# Patient Record
Sex: FEMALE | Race: WHITE | Employment: OTHER | URBAN - METROPOLITAN AREA
[De-identification: names, ages, dates, MRNs, and addresses within clinical notes are randomized per-mention and may not be internally consistent; named-entity substitution may affect disease eponyms.]

---

## 2018-10-17 ENCOUNTER — HOSPITAL ENCOUNTER (INPATIENT)
Age: 38
LOS: 3 days | Discharge: ANOTHER HOSPITAL | DRG: 876 | End: 2018-10-21
Attending: PSYCHIATRY & NEUROLOGY | Admitting: PSYCHIATRY & NEUROLOGY
Payer: COMMERCIAL

## 2018-10-17 ENCOUNTER — APPOINTMENT (OUTPATIENT)
Dept: GENERAL RADIOLOGY | Age: 38
DRG: 876 | End: 2018-10-17
Payer: COMMERCIAL

## 2018-10-17 DIAGNOSIS — Z91.199 H/O NONCOMPLIANCE WITH MEDICAL TREATMENT, PRESENTING HAZARDS TO HEALTH: ICD-10-CM

## 2018-10-17 DIAGNOSIS — N18.5 CHRONIC RENAL FAILURE, STAGE 5 (HCC): ICD-10-CM

## 2018-10-17 DIAGNOSIS — F14.10 COCAINE ABUSE (HCC): ICD-10-CM

## 2018-10-17 DIAGNOSIS — F33.2 SEVERE EPISODE OF RECURRENT MAJOR DEPRESSIVE DISORDER, WITHOUT PSYCHOTIC FEATURES (HCC): Primary | ICD-10-CM

## 2018-10-17 DIAGNOSIS — F12.10 MARIJUANA ABUSE: ICD-10-CM

## 2018-10-17 LAB
ACETAMINOPHEN LEVEL: <5 UG/ML (ref 10–30)
ALBUMIN SERPL-MCNC: 4.3 G/DL (ref 3.9–4.9)
ALP BLD-CCNC: 83 U/L (ref 40–130)
ALT SERPL-CCNC: 9 U/L (ref 0–33)
AMPHETAMINE SCREEN, URINE: ABNORMAL
ANION GAP SERPL CALCULATED.3IONS-SCNC: 20 MEQ/L (ref 7–13)
AST SERPL-CCNC: 16 U/L (ref 0–35)
BACTERIA: NORMAL /HPF
BARBITURATE SCREEN URINE: ABNORMAL
BASOPHILS ABSOLUTE: 0.1 K/UL (ref 0–0.2)
BASOPHILS RELATIVE PERCENT: 1.2 %
BENZODIAZEPINE SCREEN, URINE: POSITIVE
BILIRUB SERPL-MCNC: 0.3 MG/DL (ref 0–1.2)
BILIRUBIN URINE: NEGATIVE
BLOOD, URINE: ABNORMAL
BUN BLDV-MCNC: 56 MG/DL (ref 6–20)
CALCIUM SERPL-MCNC: 10 MG/DL (ref 8.6–10.2)
CANNABINOID SCREEN URINE: POSITIVE
CHLORIDE BLD-SCNC: 100 MEQ/L (ref 98–107)
CLARITY: CLEAR
CO2: 20 MEQ/L (ref 22–29)
COCAINE METABOLITE SCREEN URINE: POSITIVE
COLOR: YELLOW
CREAT SERPL-MCNC: 11.15 MG/DL (ref 0.5–0.9)
EKG ATRIAL RATE: 80 BPM
EKG P AXIS: 41 DEGREES
EKG P-R INTERVAL: 124 MS
EKG Q-T INTERVAL: 414 MS
EKG QRS DURATION: 88 MS
EKG QTC CALCULATION (BAZETT): 477 MS
EKG R AXIS: 9 DEGREES
EKG T AXIS: 59 DEGREES
EKG VENTRICULAR RATE: 80 BPM
EOSINOPHILS ABSOLUTE: 0.1 K/UL (ref 0–0.7)
EOSINOPHILS RELATIVE PERCENT: 0.6 %
ETHANOL PERCENT: NORMAL G/DL
ETHANOL: <10 MG/DL (ref 0–0.08)
GFR AFRICAN AMERICAN: 4.6
GFR NON-AFRICAN AMERICAN: 3.8
GLOBULIN: 2.8 G/DL (ref 2.3–3.5)
GLUCOSE BLD-MCNC: 96 MG/DL (ref 74–109)
GLUCOSE URINE: 100 MG/DL
HCT VFR BLD CALC: 39.7 % (ref 37–47)
HEMOGLOBIN: 13.1 G/DL (ref 12–16)
KETONES, URINE: NEGATIVE MG/DL
LEUKOCYTE ESTERASE, URINE: NEGATIVE
LYMPHOCYTES ABSOLUTE: 2.5 K/UL (ref 1–4.8)
LYMPHOCYTES RELATIVE PERCENT: 27.7 %
Lab: ABNORMAL
MCH RBC QN AUTO: 31.1 PG (ref 27–31.3)
MCHC RBC AUTO-ENTMCNC: 33.1 % (ref 33–37)
MCV RBC AUTO: 94.2 FL (ref 82–100)
MONOCYTES ABSOLUTE: 0.5 K/UL (ref 0.2–0.8)
MONOCYTES RELATIVE PERCENT: 5.6 %
NEUTROPHILS ABSOLUTE: 5.9 K/UL (ref 1.4–6.5)
NEUTROPHILS RELATIVE PERCENT: 64.9 %
NITRITE, URINE: NEGATIVE
OPIATE SCREEN URINE: ABNORMAL
PDW BLD-RTO: 18.4 % (ref 11.5–14.5)
PH UA: 8 (ref 5–9)
PHENCYCLIDINE SCREEN URINE: ABNORMAL
PLATELET # BLD: 189 K/UL (ref 130–400)
POTASSIUM SERPL-SCNC: 4.6 MEQ/L (ref 3.5–5.1)
PROTEIN UA: 100 MG/DL
RBC # BLD: 4.21 M/UL (ref 4.2–5.4)
RBC UA: NORMAL /HPF (ref 0–2)
SALICYLATE, SERUM: <0.3 MG/DL (ref 15–30)
SODIUM BLD-SCNC: 140 MEQ/L (ref 132–144)
SPECIFIC GRAVITY UA: 1.01 (ref 1–1.03)
TOTAL CK: 149 U/L (ref 0–170)
TOTAL PROTEIN: 7.1 G/DL (ref 6.4–8.1)
URINE REFLEX TO CULTURE: YES
UROBILINOGEN, URINE: 0.2 E.U./DL
WBC # BLD: 9.1 K/UL (ref 4.8–10.8)
WBC UA: NORMAL /HPF (ref 0–5)

## 2018-10-17 PROCEDURE — 82550 ASSAY OF CK (CPK): CPT

## 2018-10-17 PROCEDURE — 80307 DRUG TEST PRSMV CHEM ANLYZR: CPT

## 2018-10-17 PROCEDURE — G0480 DRUG TEST DEF 1-7 CLASSES: HCPCS

## 2018-10-17 PROCEDURE — 87086 URINE CULTURE/COLONY COUNT: CPT

## 2018-10-17 PROCEDURE — 6360000002 HC RX W HCPCS: Performed by: PHYSICIAN ASSISTANT

## 2018-10-17 PROCEDURE — 71045 X-RAY EXAM CHEST 1 VIEW: CPT

## 2018-10-17 PROCEDURE — 93005 ELECTROCARDIOGRAM TRACING: CPT

## 2018-10-17 PROCEDURE — 6370000000 HC RX 637 (ALT 250 FOR IP): Performed by: PHYSICIAN ASSISTANT

## 2018-10-17 PROCEDURE — 81001 URINALYSIS AUTO W/SCOPE: CPT

## 2018-10-17 PROCEDURE — 36415 COLL VENOUS BLD VENIPUNCTURE: CPT

## 2018-10-17 PROCEDURE — 85025 COMPLETE CBC W/AUTO DIFF WBC: CPT

## 2018-10-17 PROCEDURE — 80053 COMPREHEN METABOLIC PANEL: CPT

## 2018-10-17 PROCEDURE — 99285 EMERGENCY DEPT VISIT HI MDM: CPT

## 2018-10-17 RX ORDER — ONDANSETRON 2 MG/ML
4 INJECTION INTRAMUSCULAR; INTRAVENOUS ONCE
Status: COMPLETED | OUTPATIENT
Start: 2018-10-17 | End: 2018-10-17

## 2018-10-17 RX ORDER — LORAZEPAM 2 MG/ML
1 INJECTION INTRAMUSCULAR ONCE
Status: COMPLETED | OUTPATIENT
Start: 2018-10-17 | End: 2018-10-17

## 2018-10-17 RX ORDER — ACETAMINOPHEN 500 MG
1000 TABLET ORAL ONCE
Status: COMPLETED | OUTPATIENT
Start: 2018-10-17 | End: 2018-10-17

## 2018-10-17 RX ADMIN — ONDANSETRON 4 MG: 2 INJECTION INTRAMUSCULAR; INTRAVENOUS at 20:39

## 2018-10-17 RX ADMIN — LORAZEPAM 1 MG: 2 INJECTION INTRAMUSCULAR; INTRAVENOUS at 20:39

## 2018-10-17 RX ADMIN — ACETAMINOPHEN 1000 MG: 500 TABLET ORAL at 22:28

## 2018-10-17 RX ADMIN — LORAZEPAM 1 MG: 2 INJECTION INTRAMUSCULAR; INTRAVENOUS at 22:02

## 2018-10-17 ASSESSMENT — ENCOUNTER SYMPTOMS
COLOR CHANGE: 0
ABDOMINAL PAIN: 0
EYE PAIN: 0
APNEA: 0
TROUBLE SWALLOWING: 0
SHORTNESS OF BREATH: 1
ALLERGIC/IMMUNOLOGIC NEGATIVE: 1

## 2018-10-17 ASSESSMENT — PAIN DESCRIPTION - DESCRIPTORS: DESCRIPTORS: ACHING

## 2018-10-17 ASSESSMENT — PAIN SCALES - GENERAL: PAINLEVEL_OUTOF10: 10

## 2018-10-17 ASSESSMENT — PAIN DESCRIPTION - PAIN TYPE: TYPE: ACUTE PAIN

## 2018-10-17 ASSESSMENT — PAIN DESCRIPTION - FREQUENCY: FREQUENCY: CONTINUOUS

## 2018-10-17 ASSESSMENT — PAIN DESCRIPTION - LOCATION: LOCATION: HEAD;CHEST

## 2018-10-18 PROBLEM — F32.2 MAJOR DEPRESSIVE DISORDER, SEVERE (HCC): Status: ACTIVE | Noted: 2018-10-18

## 2018-10-18 LAB — AMMONIA: 35 UMOL/L (ref 11–51)

## 2018-10-18 PROCEDURE — 6370000000 HC RX 637 (ALT 250 FOR IP): Performed by: PERSONAL EMERGENCY RESPONSE ATTENDANT

## 2018-10-18 PROCEDURE — 6370000000 HC RX 637 (ALT 250 FOR IP): Performed by: PSYCHIATRY & NEUROLOGY

## 2018-10-18 PROCEDURE — 36415 COLL VENOUS BLD VENIPUNCTURE: CPT

## 2018-10-18 PROCEDURE — 82140 ASSAY OF AMMONIA: CPT

## 2018-10-18 PROCEDURE — 93010 ELECTROCARDIOGRAM REPORT: CPT | Performed by: INTERNAL MEDICINE

## 2018-10-18 PROCEDURE — 1240000000 HC EMOTIONAL WELLNESS R&B

## 2018-10-18 PROCEDURE — 90792 PSYCH DIAG EVAL W/MED SRVCS: CPT | Performed by: CLINICAL NURSE SPECIALIST

## 2018-10-18 PROCEDURE — 6370000000 HC RX 637 (ALT 250 FOR IP): Performed by: STUDENT IN AN ORGANIZED HEALTH CARE EDUCATION/TRAINING PROGRAM

## 2018-10-18 PROCEDURE — 6370000000 HC RX 637 (ALT 250 FOR IP): Performed by: EMERGENCY MEDICINE

## 2018-10-18 RX ORDER — MYCOPHENOLIC ACID 180 MG/1
180 TABLET, DELAYED RELEASE ORAL DAILY
COMMUNITY

## 2018-10-18 RX ORDER — POTASSIUM CHLORIDE 750 MG/1
20 CAPSULE, EXTENDED RELEASE ORAL
Status: DISCONTINUED | OUTPATIENT
Start: 2018-10-19 | End: 2018-10-18

## 2018-10-18 RX ORDER — HALOPERIDOL 5 MG/ML
5 INJECTION INTRAMUSCULAR EVERY 4 HOURS PRN
Status: DISCONTINUED | OUTPATIENT
Start: 2018-10-18 | End: 2018-10-22 | Stop reason: HOSPADM

## 2018-10-18 RX ORDER — LORAZEPAM 1 MG/1
2 TABLET ORAL ONCE
Status: COMPLETED | OUTPATIENT
Start: 2018-10-18 | End: 2018-10-18

## 2018-10-18 RX ORDER — LORAZEPAM 0.5 MG/1
0.5 TABLET ORAL ONCE
Status: COMPLETED | OUTPATIENT
Start: 2018-10-18 | End: 2018-10-18

## 2018-10-18 RX ORDER — LORAZEPAM 1 MG/1
1 TABLET ORAL ONCE
Status: COMPLETED | OUTPATIENT
Start: 2018-10-18 | End: 2018-10-18

## 2018-10-18 RX ORDER — HYDROXYZINE HYDROCHLORIDE 50 MG/ML
50 INJECTION, SOLUTION INTRAMUSCULAR EVERY 6 HOURS PRN
Status: DISCONTINUED | OUTPATIENT
Start: 2018-10-18 | End: 2018-10-22 | Stop reason: HOSPADM

## 2018-10-18 RX ORDER — SEVELAMER CARBONATE 800 MG/1
200 TABLET, FILM COATED ORAL
COMMUNITY

## 2018-10-18 RX ORDER — SODIUM BICARBONATE 650 MG/1
650 TABLET ORAL 4 TIMES DAILY
Status: DISCONTINUED | OUTPATIENT
Start: 2018-10-18 | End: 2018-10-18

## 2018-10-18 RX ORDER — HALOPERIDOL 5 MG/ML
5 INJECTION INTRAMUSCULAR EVERY 4 HOURS PRN
Status: DISCONTINUED | OUTPATIENT
Start: 2018-10-18 | End: 2018-10-18 | Stop reason: ALTCHOICE

## 2018-10-18 RX ORDER — GABAPENTIN 100 MG/1
100 CAPSULE ORAL NIGHTLY
COMMUNITY

## 2018-10-18 RX ORDER — HYDROXYZINE PAMOATE 50 MG/1
50 CAPSULE ORAL EVERY 6 HOURS PRN
Status: DISCONTINUED | OUTPATIENT
Start: 2018-10-18 | End: 2018-10-22 | Stop reason: HOSPADM

## 2018-10-18 RX ORDER — HALOPERIDOL 5 MG
5 TABLET ORAL EVERY 4 HOURS PRN
Status: DISCONTINUED | OUTPATIENT
Start: 2018-10-18 | End: 2018-10-22 | Stop reason: HOSPADM

## 2018-10-18 RX ORDER — SODIUM BICARBONATE 650 MG/1
650 TABLET ORAL 4 TIMES DAILY
COMMUNITY

## 2018-10-18 RX ORDER — HYDROXYZINE HYDROCHLORIDE 50 MG/ML
50 INJECTION, SOLUTION INTRAMUSCULAR EVERY 6 HOURS PRN
Status: DISCONTINUED | OUTPATIENT
Start: 2018-10-18 | End: 2018-10-18

## 2018-10-18 RX ORDER — BENZTROPINE MESYLATE 1 MG/ML
2 INJECTION INTRAMUSCULAR; INTRAVENOUS 2 TIMES DAILY PRN
Status: DISCONTINUED | OUTPATIENT
Start: 2018-10-18 | End: 2018-10-22 | Stop reason: HOSPADM

## 2018-10-18 RX ORDER — SEVELAMER CARBONATE 800 MG/1
4 TABLET, FILM COATED ORAL
COMMUNITY

## 2018-10-18 RX ORDER — POTASSIUM CHLORIDE 750 MG/1
20 CAPSULE, EXTENDED RELEASE ORAL
COMMUNITY

## 2018-10-18 RX ORDER — SEVELAMER CARBONATE 800 MG/1
3200 TABLET, FILM COATED ORAL
Status: DISCONTINUED | OUTPATIENT
Start: 2018-10-18 | End: 2018-10-21

## 2018-10-18 RX ORDER — ALBUMIN (HUMAN) 12.5 G/50ML
25 SOLUTION INTRAVENOUS
Status: DISPENSED | OUTPATIENT
Start: 2018-10-18 | End: 2018-10-18

## 2018-10-18 RX ORDER — TRAZODONE HYDROCHLORIDE 50 MG/1
50 TABLET ORAL NIGHTLY PRN
Status: DISCONTINUED | OUTPATIENT
Start: 2018-10-18 | End: 2018-10-19

## 2018-10-18 RX ORDER — HYDROXYZINE PAMOATE 50 MG/1
50 CAPSULE ORAL ONCE
Status: COMPLETED | OUTPATIENT
Start: 2018-10-18 | End: 2018-10-18

## 2018-10-18 RX ORDER — SEVELAMER CARBONATE 800 MG/1
400 TABLET, FILM COATED ORAL
Status: DISCONTINUED | OUTPATIENT
Start: 2018-10-18 | End: 2018-10-22 | Stop reason: HOSPADM

## 2018-10-18 RX ORDER — HYDROXYZINE PAMOATE 50 MG/1
50 CAPSULE ORAL EVERY 6 HOURS PRN
Status: DISCONTINUED | OUTPATIENT
Start: 2018-10-18 | End: 2018-10-18 | Stop reason: ALTCHOICE

## 2018-10-18 RX ORDER — SIROLIMUS 1 MG/1
2 TABLET, FILM COATED ORAL DAILY
Status: DISCONTINUED | OUTPATIENT
Start: 2018-10-18 | End: 2018-10-22 | Stop reason: HOSPADM

## 2018-10-18 RX ORDER — SODIUM CHLORIDE 9 MG/ML
250 INJECTION, SOLUTION INTRAVENOUS PRN
Status: ACTIVE | OUTPATIENT
Start: 2018-10-18 | End: 2018-10-19

## 2018-10-18 RX ORDER — SEVELAMER CARBONATE 800 MG/1
200 TABLET, FILM COATED ORAL
Status: DISCONTINUED | OUTPATIENT
Start: 2018-10-18 | End: 2018-10-18

## 2018-10-18 RX ORDER — ACETAMINOPHEN 80 MG
TABLET,CHEWABLE ORAL ONCE
Status: COMPLETED | OUTPATIENT
Start: 2018-10-18 | End: 2018-10-18

## 2018-10-18 RX ORDER — HEPARIN SODIUM 1000 [USP'U]/ML
1000 INJECTION, SOLUTION INTRAVENOUS; SUBCUTANEOUS PRN
Status: DISCONTINUED | OUTPATIENT
Start: 2018-10-18 | End: 2018-10-22 | Stop reason: HOSPADM

## 2018-10-18 RX ORDER — MYCOPHENOLIC ACID 360 MG/1
180 TABLET, DELAYED RELEASE ORAL DAILY
Status: DISCONTINUED | OUTPATIENT
Start: 2018-10-18 | End: 2018-10-22 | Stop reason: HOSPADM

## 2018-10-18 RX ORDER — MAGNESIUM HYDROXIDE/ALUMINUM HYDROXICE/SIMETHICONE 120; 1200; 1200 MG/30ML; MG/30ML; MG/30ML
30 SUSPENSION ORAL PRN
Status: DISCONTINUED | OUTPATIENT
Start: 2018-10-18 | End: 2018-10-19

## 2018-10-18 RX ORDER — HYDROXYZINE PAMOATE 50 MG/1
50 CAPSULE ORAL 4 TIMES DAILY PRN
Status: DISCONTINUED | OUTPATIENT
Start: 2018-10-18 | End: 2018-10-18

## 2018-10-18 RX ADMIN — Medication: at 20:53

## 2018-10-18 RX ADMIN — HALOPERIDOL 5 MG: 5 TABLET ORAL at 17:19

## 2018-10-18 RX ADMIN — LORAZEPAM 2 MG: 1 TABLET ORAL at 05:17

## 2018-10-18 RX ADMIN — MYCOPHENOLIC ACID 180 MG: 360 TABLET, DELAYED RELEASE ORAL at 20:49

## 2018-10-18 RX ADMIN — LORAZEPAM 0.5 MG: 0.5 TABLET ORAL at 12:03

## 2018-10-18 RX ADMIN — SEVELAMER CARBONATE 400 MG: 800 TABLET, FILM COATED ORAL at 21:50

## 2018-10-18 RX ADMIN — SEVELAMER CARBONATE 3200 MG: 800 TABLET, FILM COATED ORAL at 17:24

## 2018-10-18 RX ADMIN — SIROLIMUS 2 MG: 1 TABLET, FILM COATED ORAL at 20:49

## 2018-10-18 RX ADMIN — LORAZEPAM 1 MG: 1 TABLET ORAL at 09:06

## 2018-10-18 RX ADMIN — TRAZODONE HYDROCHLORIDE 50 MG: 50 TABLET ORAL at 20:49

## 2018-10-18 RX ADMIN — HYDROXYZINE PAMOATE 50 MG: 50 CAPSULE ORAL at 10:36

## 2018-10-18 ASSESSMENT — SLEEP AND FATIGUE QUESTIONNAIRES
SLEEP PATTERN: DIFFICULTY FALLING ASLEEP;INSOMNIA;DISTURBED/INTERRUPTED SLEEP
DIFFICULTY FALLING ASLEEP: YES
DIFFICULTY ARISING: NO
DIFFICULTY STAYING ASLEEP: YES
DO YOU USE A SLEEP AID: NO
DO YOU HAVE DIFFICULTY SLEEPING: YES
RESTFUL SLEEP: NO
AVERAGE NUMBER OF SLEEP HOURS: 2

## 2018-10-18 ASSESSMENT — ENCOUNTER SYMPTOMS: EYES NEGATIVE: 1

## 2018-10-18 ASSESSMENT — PATIENT HEALTH QUESTIONNAIRE - PHQ9: SUM OF ALL RESPONSES TO PHQ QUESTIONS 1-9: 27

## 2018-10-18 NOTE — ED NOTES
Patient remains too lethargic for assessment. Awaiting patient to awaken from Ativan.      Renetta Mc RN  10/18/18 0892

## 2018-10-18 NOTE — ED NOTES
Primary  unit without premission. Seen walking out of outpatient surgery doors. Returned without incident. 3 west report has alread ybeen given. Pt to Ayo Cooney  10/18/18 1607

## 2018-10-18 NOTE — ED NOTES
Patient remains very lethargic, sleeping, after administration of Ativan in the medical ED.  Awaiting patient to become more awake to complete psych eval.      Keenan Lange RN  10/18/18 8626

## 2018-10-18 NOTE — ED NOTES
Pt hypervenitaling.s tating that she is anxious. States this was brought on by the bad auroa of a new t increasing her anxiety level. Jhon Oneal  10/18/18 1380

## 2018-10-18 NOTE — CARE COORDINATION
Met with patient and started assessment. Patient is very agitated due to not receiving the anxiety medication she wanted. Patient stated that since she cannot see the doctor until tomorrow she is signing herself out. Patient walked out of her room. Assessment deferred until tomorrow.   Electronically signed by Tanner Vanessa [facility] on 10/18/2018 at 3:56 PM

## 2018-10-19 ENCOUNTER — APPOINTMENT (OUTPATIENT)
Dept: INTERVENTIONAL RADIOLOGY/VASCULAR | Age: 38
DRG: 876 | End: 2018-10-19
Payer: COMMERCIAL

## 2018-10-19 LAB — URINE CULTURE, ROUTINE: NORMAL

## 2018-10-19 PROCEDURE — 6360000002 HC RX W HCPCS: Performed by: NURSE PRACTITIONER

## 2018-10-19 PROCEDURE — 36905 THRMBC/NFS DIALYSIS CIRCUIT: CPT | Performed by: RADIOLOGY

## 2018-10-19 PROCEDURE — 03CY3ZZ EXTIRPATION OF MATTER FROM UPPER ARTERY, PERCUTANEOUS APPROACH: ICD-10-PCS | Performed by: RADIOLOGY

## 2018-10-19 PROCEDURE — 36907 BALO ANGIOP CTR DIALYSIS SEG: CPT | Performed by: RADIOLOGY

## 2018-10-19 PROCEDURE — 1240000000 HC EMOTIONAL WELLNESS R&B

## 2018-10-19 PROCEDURE — 2709999900 IR DIALYSIS FISTULAGRAM EVAL

## 2018-10-19 PROCEDURE — 2500000003 HC RX 250 WO HCPCS: Performed by: NURSE PRACTITIONER

## 2018-10-19 PROCEDURE — 037Y3ZZ DILATION OF UPPER ARTERY, PERCUTANEOUS APPROACH: ICD-10-PCS | Performed by: RADIOLOGY

## 2018-10-19 PROCEDURE — 2580000003 HC RX 258: Performed by: NURSE PRACTITIONER

## 2018-10-19 PROCEDURE — S0028 INJECTION, FAMOTIDINE, 20 MG: HCPCS | Performed by: NURSE PRACTITIONER

## 2018-10-19 PROCEDURE — 6370000000 HC RX 637 (ALT 250 FOR IP): Performed by: NURSE PRACTITIONER

## 2018-10-19 PROCEDURE — 6370000000 HC RX 637 (ALT 250 FOR IP): Performed by: PSYCHIATRY & NEUROLOGY

## 2018-10-19 PROCEDURE — 99223 1ST HOSP IP/OBS HIGH 75: CPT | Performed by: RADIOLOGY

## 2018-10-19 PROCEDURE — 6360000004 HC RX CONTRAST MEDICATION: Performed by: NURSE PRACTITIONER

## 2018-10-19 PROCEDURE — B51W1ZZ FLUOROSCOPY OF DIALYSIS SHUNT/FISTULA USING LOW OSMOLAR CONTRAST: ICD-10-PCS | Performed by: RADIOLOGY

## 2018-10-19 PROCEDURE — 6360000002 HC RX W HCPCS: Performed by: INTERNAL MEDICINE

## 2018-10-19 PROCEDURE — 99222 1ST HOSP IP/OBS MODERATE 55: CPT | Performed by: PSYCHIATRY & NEUROLOGY

## 2018-10-19 RX ORDER — VENLAFAXINE HYDROCHLORIDE 37.5 MG/1
37.5 CAPSULE, EXTENDED RELEASE ORAL
Status: DISCONTINUED | OUTPATIENT
Start: 2018-10-19 | End: 2018-10-22 | Stop reason: HOSPADM

## 2018-10-19 RX ORDER — TRAZODONE HYDROCHLORIDE 50 MG/1
50 TABLET ORAL NIGHTLY
Status: DISCONTINUED | OUTPATIENT
Start: 2018-10-19 | End: 2018-10-22 | Stop reason: HOSPADM

## 2018-10-19 RX ORDER — HYDRALAZINE HYDROCHLORIDE 25 MG/1
25 TABLET, FILM COATED ORAL ONCE
Status: COMPLETED | OUTPATIENT
Start: 2018-10-19 | End: 2018-10-19

## 2018-10-19 RX ORDER — HEPARIN SODIUM 5000 [USP'U]/ML
6000 INJECTION, SOLUTION INTRAVENOUS; SUBCUTANEOUS ONCE
Status: DISCONTINUED | OUTPATIENT
Start: 2018-10-19 | End: 2018-10-22 | Stop reason: HOSPADM

## 2018-10-19 RX ORDER — 0.9 % SODIUM CHLORIDE 0.9 %
1000 INTRAVENOUS SOLUTION INTRAVENOUS
Status: DISCONTINUED | OUTPATIENT
Start: 2018-10-19 | End: 2018-10-22 | Stop reason: HOSPADM

## 2018-10-19 RX ORDER — SODIUM CHLORIDE 9 MG/ML
250 INJECTION, SOLUTION INTRAVENOUS
Status: DISCONTINUED | OUTPATIENT
Start: 2018-10-19 | End: 2018-10-22 | Stop reason: HOSPADM

## 2018-10-19 RX ORDER — IODIXANOL 320 MG/ML
100 INJECTION, SOLUTION INTRAVASCULAR
Status: COMPLETED | OUTPATIENT
Start: 2018-10-19 | End: 2018-10-19

## 2018-10-19 RX ORDER — METHYLPREDNISOLONE SODIUM SUCCINATE 125 MG/2ML
125 INJECTION, POWDER, LYOPHILIZED, FOR SOLUTION INTRAMUSCULAR; INTRAVENOUS ONCE
Status: COMPLETED | OUTPATIENT
Start: 2018-10-19 | End: 2018-10-19

## 2018-10-19 RX ORDER — CLONAZEPAM 0.5 MG/1
0.5 TABLET ORAL 2 TIMES DAILY
Status: DISCONTINUED | OUTPATIENT
Start: 2018-10-19 | End: 2018-10-22 | Stop reason: HOSPADM

## 2018-10-19 RX ORDER — DIPHENHYDRAMINE HCL 25 MG
50 TABLET ORAL ONCE
Status: COMPLETED | OUTPATIENT
Start: 2018-10-19 | End: 2018-10-19

## 2018-10-19 RX ORDER — DIPHENHYDRAMINE HYDROCHLORIDE 50 MG/ML
50 INJECTION INTRAMUSCULAR; INTRAVENOUS ONCE
Status: DISCONTINUED | OUTPATIENT
Start: 2018-10-19 | End: 2018-10-19

## 2018-10-19 RX ORDER — LIDOCAINE HYDROCHLORIDE 20 MG/ML
5 INJECTION, SOLUTION INFILTRATION; PERINEURAL
Status: DISCONTINUED | OUTPATIENT
Start: 2018-10-19 | End: 2018-10-22 | Stop reason: HOSPADM

## 2018-10-19 RX ORDER — TRAMADOL HYDROCHLORIDE 50 MG/1
50 TABLET ORAL EVERY 6 HOURS PRN
Status: COMPLETED | OUTPATIENT
Start: 2018-10-19 | End: 2018-10-20

## 2018-10-19 RX ORDER — MIDAZOLAM HYDROCHLORIDE 1 MG/ML
2 INJECTION INTRAMUSCULAR; INTRAVENOUS
Status: DISCONTINUED | OUTPATIENT
Start: 2018-10-19 | End: 2018-10-22 | Stop reason: HOSPADM

## 2018-10-19 RX ORDER — FENTANYL CITRATE 50 UG/ML
50 INJECTION, SOLUTION INTRAMUSCULAR; INTRAVENOUS
Status: DISCONTINUED | OUTPATIENT
Start: 2018-10-19 | End: 2018-10-22 | Stop reason: HOSPADM

## 2018-10-19 RX ADMIN — MIDAZOLAM HYDROCHLORIDE 0.25 MG: 1 INJECTION, SOLUTION INTRAMUSCULAR; INTRAVENOUS at 15:22

## 2018-10-19 RX ADMIN — TRAZODONE HYDROCHLORIDE 50 MG: 50 TABLET ORAL at 21:04

## 2018-10-19 RX ADMIN — SEVELAMER CARBONATE 3200 MG: 800 TABLET, FILM COATED ORAL at 21:03

## 2018-10-19 RX ADMIN — DIPHENHYDRAMINE HCL 50 MG: 25 TABLET ORAL at 12:10

## 2018-10-19 RX ADMIN — SIROLIMUS 2 MG: 1 TABLET, FILM COATED ORAL at 08:26

## 2018-10-19 RX ADMIN — CLONAZEPAM 0.5 MG: 0.5 TABLET ORAL at 12:10

## 2018-10-19 RX ADMIN — FENTANYL CITRATE 50 MCG: 50 INJECTION, SOLUTION INTRAMUSCULAR; INTRAVENOUS at 14:38

## 2018-10-19 RX ADMIN — HYDRALAZINE HYDROCHLORIDE 25 MG: 25 TABLET, FILM COATED ORAL at 21:04

## 2018-10-19 RX ADMIN — HEPARIN SODIUM 1000 UNITS: 1000 INJECTION, SOLUTION INTRAVENOUS; SUBCUTANEOUS at 14:07

## 2018-10-19 RX ADMIN — SODIUM CHLORIDE: 9 INJECTION, SOLUTION INTRAVENOUS at 14:07

## 2018-10-19 RX ADMIN — MIDAZOLAM HYDROCHLORIDE 0.5 MG: 1 INJECTION, SOLUTION INTRAMUSCULAR; INTRAVENOUS at 14:19

## 2018-10-19 RX ADMIN — MYCOPHENOLIC ACID 180 MG: 360 TABLET, DELAYED RELEASE ORAL at 08:33

## 2018-10-19 RX ADMIN — CLONAZEPAM 0.5 MG: 0.5 TABLET ORAL at 21:04

## 2018-10-19 RX ADMIN — FAMOTIDINE 20 MG: 10 INJECTION, SOLUTION INTRAVENOUS at 13:55

## 2018-10-19 RX ADMIN — SODIUM CHLORIDE 250 ML: 9 INJECTION, SOLUTION INTRAVENOUS at 14:07

## 2018-10-19 RX ADMIN — SEVELAMER CARBONATE 400 MG: 800 TABLET, FILM COATED ORAL at 08:32

## 2018-10-19 RX ADMIN — FENTANYL CITRATE 0.25 MCG: 50 INJECTION, SOLUTION INTRAMUSCULAR; INTRAVENOUS at 15:28

## 2018-10-19 RX ADMIN — LIDOCAINE HYDROCHLORIDE 5 ML: 20 INJECTION, SOLUTION INFILTRATION; PERINEURAL at 15:36

## 2018-10-19 RX ADMIN — SEVELAMER CARBONATE 3200 MG: 800 TABLET, FILM COATED ORAL at 08:33

## 2018-10-19 RX ADMIN — VENLAFAXINE HYDROCHLORIDE 37.5 MG: 37.5 CAPSULE, EXTENDED RELEASE ORAL at 12:10

## 2018-10-19 RX ADMIN — IODIXANOL 100 ML: 320 INJECTION, SOLUTION INTRAVASCULAR at 14:07

## 2018-10-19 RX ADMIN — LIDOCAINE HYDROCHLORIDE 5 ML: 20 INJECTION, SOLUTION INFILTRATION; PERINEURAL at 14:42

## 2018-10-19 RX ADMIN — HEPARIN SODIUM 2000 UNITS: 1000 INJECTION, SOLUTION INTRAVENOUS; SUBCUTANEOUS at 14:52

## 2018-10-19 RX ADMIN — HYDROXYZINE PAMOATE 50 MG: 50 CAPSULE ORAL at 23:42

## 2018-10-19 RX ADMIN — TRAMADOL HYDROCHLORIDE 50 MG: 50 TABLET, FILM COATED ORAL at 21:04

## 2018-10-19 RX ADMIN — METHYLPREDNISOLONE SODIUM SUCCINATE 125 MG: 125 INJECTION, POWDER, FOR SOLUTION INTRAMUSCULAR; INTRAVENOUS at 13:50

## 2018-10-19 RX ADMIN — HEPARIN SODIUM 1000 UNITS: 1000 INJECTION, SOLUTION INTRAVENOUS; SUBCUTANEOUS at 15:29

## 2018-10-19 RX ADMIN — MIDAZOLAM HYDROCHLORIDE 0.5 MG: 1 INJECTION, SOLUTION INTRAMUSCULAR; INTRAVENOUS at 14:39

## 2018-10-19 RX ADMIN — FENTANYL CITRATE 50 MCG: 50 INJECTION, SOLUTION INTRAMUSCULAR; INTRAVENOUS at 14:18

## 2018-10-19 RX ADMIN — MIDAZOLAM HYDROCHLORIDE 0.5 MG: 1 INJECTION, SOLUTION INTRAMUSCULAR; INTRAVENOUS at 14:59

## 2018-10-19 ASSESSMENT — PAIN DESCRIPTION - FREQUENCY: FREQUENCY: CONTINUOUS

## 2018-10-19 ASSESSMENT — PAIN SCALES - GENERAL
PAINLEVEL_OUTOF10: 8
PAINLEVEL_OUTOF10: 5
PAINLEVEL_OUTOF10: 9

## 2018-10-19 ASSESSMENT — PAIN DESCRIPTION - DESCRIPTORS
DESCRIPTORS: ACHING
DESCRIPTORS: DULL;SORE

## 2018-10-19 ASSESSMENT — PAIN DESCRIPTION - ORIENTATION
ORIENTATION: LEFT
ORIENTATION: LEFT

## 2018-10-19 ASSESSMENT — PAIN DESCRIPTION - LOCATION
LOCATION: ARM
LOCATION: ARM

## 2018-10-19 ASSESSMENT — PAIN DESCRIPTION - ONSET: ONSET: ON-GOING

## 2018-10-19 ASSESSMENT — PAIN DESCRIPTION - PAIN TYPE
TYPE: SURGICAL PAIN
TYPE: SURGICAL PAIN

## 2018-10-19 ASSESSMENT — LIFESTYLE VARIABLES: HISTORY_ALCOHOL_USE: NO

## 2018-10-19 ASSESSMENT — PAIN DESCRIPTION - PROGRESSION: CLINICAL_PROGRESSION: GRADUALLY IMPROVING

## 2018-10-19 NOTE — SEDATION DOCUMENTATION
Dr. Audie Davis using inflation and deflation cycles to angioplast fistula. Continues to use fluoro guidance  while working on the fistula. Dr. Audie Davis using the [employer] guidewire at this time to maintain access. He removed one of the 7 Fr sheaths and is replacing it with a new 7 Fr sheath. 46 Dr. Audie Davis using the balloon again 7mm x 40 mm alternating between inflation and deflation cycles to angioplast fistula.

## 2018-10-19 NOTE — SEDATION DOCUMENTATION
Dr. Carlos Tucker using U/S for access to fistula. Using a 5 Fr x 10 cm MP set . Lot no. [id number]. Fistula accessed and 7 Fr sheath placed. Lot no. [id number].  1446 Dr. Carlos Tucker using fluoro and contrast to visualize fistula patency. He is using a [employer] guide wire . 035 x 150 cm. Lot no. [id number] to maintain access of fistula.

## 2018-10-19 NOTE — SEDATION DOCUMENTATION
Both 7 Armenian sheaths removed and slight pressure being held at the site to obtain hemostasis. Patient tolerated procedure well. Fistulogram forearm sites covered with small guaze and tegaderm.

## 2018-10-19 NOTE — SEDATION DOCUMENTATION
Left arm cleaned, prepped, and draped by Techs in the room. Dr. Nga Bravo is going to use ultrasound and fluoro guidance for the fistulogram. Patient is anxious, restless on the table.

## 2018-10-19 NOTE — SEDATION DOCUMENTATION
Dr. Ilene Meredith using the balloon 7 mm x 40 mm from earlier to angioplast the upper portion of the fistula.

## 2018-10-19 NOTE — SEDATION DOCUMENTATION
Dr. Niranjan Childs has switched to using a 7mm x 40 mm balloon to angioplast  the fistula graft. Balloon ionflated for one minute then deflated. Patient tolerating. Balloon is a [employer] . Lot no. [id number].   9355 Inflation of balloon. 1512 Deflation of balloon. 1513 Inflation of balloon. 1514 Deflation of balloon. 1515 Inflation of balloon. 1516 Deflation of balloon. 1518 Inflation of balloon.

## 2018-10-19 NOTE — BH NOTE
Patient did not attend Group Therapy from [id number], despite encouragement.
catheter. Tip overlying the right atrium. The cardiac silhouette is of normal size configuration. The mediastinum is unremarkable. Pulmonary vascular unremarkable. Right sided trachea. No focal infiltrates. No Pneumothoraces. NO ACUTE ACTIVE CARDIOPULMONARY PROCESS      TREATMENT PLAN:    Risk Management:  suicide risk    Collateral Information:  Pt reports able to speak to her [facility] case manger/ dialysis center     Home medication Reconcilled    New Medications started during this admission :    Pt on no home psych medications     Prn Haldol 5mg and Vistaril 50mg q6hr for extreme agitation. Discussed with the patient risk, benefit, alternative and common side effects for the  proposed medication treatment. Patient is consenting to the treatment. Admit to [address] [place] [address]. once medically cleared/ Pt reports willing to sign voluntary admission form/ If pt refuses to sign voluntary pink slip pt.         Electronically signed by BRITTANY Faustin CNP on 10/18/2018 at 10:56 AM

## 2018-10-20 PROCEDURE — 6370000000 HC RX 637 (ALT 250 FOR IP): Performed by: PSYCHIATRY & NEUROLOGY

## 2018-10-20 PROCEDURE — 6370000000 HC RX 637 (ALT 250 FOR IP): Performed by: NURSE PRACTITIONER

## 2018-10-20 PROCEDURE — 6370000000 HC RX 637 (ALT 250 FOR IP): Performed by: PHYSICIAN ASSISTANT

## 2018-10-20 PROCEDURE — 1240000000 HC EMOTIONAL WELLNESS R&B

## 2018-10-20 RX ORDER — TRAMADOL HYDROCHLORIDE 50 MG/1
50 TABLET ORAL EVERY 6 HOURS PRN
Status: DISCONTINUED | OUTPATIENT
Start: 2018-10-20 | End: 2018-10-22 | Stop reason: HOSPADM

## 2018-10-20 RX ORDER — LIDOCAINE AND PRILOCAINE 25; 25 MG/G; MG/G
CREAM TOPICAL DAILY PRN
Status: DISCONTINUED | OUTPATIENT
Start: 2018-10-20 | End: 2018-10-22 | Stop reason: HOSPADM

## 2018-10-20 RX ADMIN — CLONAZEPAM 0.5 MG: 0.5 TABLET ORAL at 08:12

## 2018-10-20 RX ADMIN — NICOTINE POLACRILEX 2 MG: 2 GUM, CHEWING BUCCAL at 10:20

## 2018-10-20 RX ADMIN — TRAMADOL HYDROCHLORIDE 50 MG: 50 TABLET, FILM COATED ORAL at 15:38

## 2018-10-20 RX ADMIN — SEVELAMER CARBONATE 3200 MG: 800 TABLET, FILM COATED ORAL at 08:40

## 2018-10-20 RX ADMIN — CLONAZEPAM 0.5 MG: 0.5 TABLET ORAL at 21:34

## 2018-10-20 RX ADMIN — NICOTINE POLACRILEX 2 MG: 2 GUM, CHEWING BUCCAL at 22:30

## 2018-10-20 RX ADMIN — HYDROXYZINE PAMOATE 50 MG: 50 CAPSULE ORAL at 21:34

## 2018-10-20 RX ADMIN — VENLAFAXINE HYDROCHLORIDE 37.5 MG: 37.5 CAPSULE, EXTENDED RELEASE ORAL at 08:12

## 2018-10-20 RX ADMIN — HYDROXYZINE PAMOATE 50 MG: 50 CAPSULE ORAL at 09:17

## 2018-10-20 RX ADMIN — TRAZODONE HYDROCHLORIDE 50 MG: 50 TABLET ORAL at 21:34

## 2018-10-20 RX ADMIN — HYDROXYZINE PAMOATE 50 MG: 50 CAPSULE ORAL at 15:38

## 2018-10-20 RX ADMIN — SEVELAMER CARBONATE 3200 MG: 800 TABLET, FILM COATED ORAL at 12:42

## 2018-10-20 RX ADMIN — SIROLIMUS 2 MG: 1 TABLET, FILM COATED ORAL at 08:11

## 2018-10-20 RX ADMIN — TRAMADOL HYDROCHLORIDE 50 MG: 50 TABLET, FILM COATED ORAL at 22:15

## 2018-10-20 RX ADMIN — MYCOPHENOLIC ACID 180 MG: 360 TABLET, DELAYED RELEASE ORAL at 08:11

## 2018-10-20 RX ADMIN — TRAMADOL HYDROCHLORIDE 50 MG: 50 TABLET, FILM COATED ORAL at 09:17

## 2018-10-20 RX ADMIN — NICOTINE POLACRILEX 2 MG: 2 GUM, CHEWING BUCCAL at 08:13

## 2018-10-20 RX ADMIN — SEVELAMER CARBONATE 3200 MG: 800 TABLET, FILM COATED ORAL at 17:05

## 2018-10-20 RX ADMIN — TRAMADOL HYDROCHLORIDE 50 MG: 50 TABLET, FILM COATED ORAL at 03:13

## 2018-10-20 RX ADMIN — NICOTINE POLACRILEX 2 MG: 2 GUM, CHEWING BUCCAL at 20:13

## 2018-10-20 ASSESSMENT — PAIN SCALES - GENERAL
PAINLEVEL_OUTOF10: 4
PAINLEVEL_OUTOF10: 8
PAINLEVEL_OUTOF10: 5
PAINLEVEL_OUTOF10: 6
PAINLEVEL_OUTOF10: 5

## 2018-10-20 NOTE — CARE COORDINATION
Patient refusing renal diet States she is not on renal diet at home and her Dr is aware Patient is aware of foods she has to limit Patient knows risks and benefits

## 2018-10-21 VITALS
BODY MASS INDEX: 22.52 KG/M2 | TEMPERATURE: 102.6 F | HEIGHT: 62 IN | SYSTOLIC BLOOD PRESSURE: 119 MMHG | HEART RATE: 110 BPM | OXYGEN SATURATION: 98 % | RESPIRATION RATE: 20 BRPM | WEIGHT: 122.36 LBS | DIASTOLIC BLOOD PRESSURE: 78 MMHG

## 2018-10-21 PROBLEM — R50.9 FEVER: Status: ACTIVE | Noted: 2018-10-21

## 2018-10-21 LAB
ALBUMIN SERPL-MCNC: 3.6 G/DL (ref 3.9–4.9)
ALP BLD-CCNC: 64 U/L (ref 40–130)
ALT SERPL-CCNC: 9 U/L (ref 0–33)
ANION GAP SERPL CALCULATED.3IONS-SCNC: 18 MEQ/L (ref 7–13)
AST SERPL-CCNC: 15 U/L (ref 0–35)
BACTERIA: NORMAL /HPF
BASOPHILS ABSOLUTE: 0 K/UL (ref 0–0.2)
BASOPHILS RELATIVE PERCENT: 0.4 %
BILIRUB SERPL-MCNC: 0.3 MG/DL (ref 0–1.2)
BILIRUBIN URINE: NEGATIVE
BLOOD, URINE: NEGATIVE
BUN BLDV-MCNC: 60 MG/DL (ref 6–20)
CALCIUM SERPL-MCNC: 8.3 MG/DL (ref 8.6–10.2)
CHLORIDE BLD-SCNC: 94 MEQ/L (ref 98–107)
CLARITY: ABNORMAL
CO2: 20 MEQ/L (ref 22–29)
COLOR: YELLOW
CREAT SERPL-MCNC: 8.29 MG/DL (ref 0.5–0.9)
EOSINOPHILS ABSOLUTE: 0 K/UL (ref 0–0.7)
EOSINOPHILS RELATIVE PERCENT: 0.1 %
EPITHELIAL CELLS, UA: NORMAL /HPF
GFR AFRICAN AMERICAN: 6.5
GFR NON-AFRICAN AMERICAN: 5.4
GLOBULIN: 2.5 G/DL (ref 2.3–3.5)
GLUCOSE BLD-MCNC: 89 MG/DL (ref 74–109)
GLUCOSE URINE: 100 MG/DL
HCT VFR BLD CALC: 32.7 % (ref 37–47)
HEMOGLOBIN: 10.9 G/DL (ref 12–16)
KETONES, URINE: NEGATIVE MG/DL
LACTIC ACID: 1.2 MMOL/L (ref 0.5–2.2)
LEUKOCYTE ESTERASE, URINE: NEGATIVE
LYMPHOCYTES ABSOLUTE: 0.5 K/UL (ref 1–4.8)
LYMPHOCYTES RELATIVE PERCENT: 4.8 %
MAGNESIUM: 1.7 MG/DL (ref 1.7–2.3)
MCH RBC QN AUTO: 31 PG (ref 27–31.3)
MCHC RBC AUTO-ENTMCNC: 33.2 % (ref 33–37)
MCV RBC AUTO: 93.5 FL (ref 82–100)
MONOCYTES ABSOLUTE: 0.4 K/UL (ref 0.2–0.8)
MONOCYTES RELATIVE PERCENT: 3.2 %
NEUTROPHILS ABSOLUTE: 10.3 K/UL (ref 1.4–6.5)
NEUTROPHILS RELATIVE PERCENT: 91.5 %
NITRITE, URINE: NEGATIVE
PDW BLD-RTO: 18.8 % (ref 11.5–14.5)
PH UA: 7.5 (ref 5–9)
PHOSPHORUS: 5.5 MG/DL (ref 2.5–4.5)
PLATELET # BLD: 111 K/UL (ref 130–400)
POTASSIUM SERPL-SCNC: 5 MEQ/L (ref 3.5–5.1)
PROTEIN UA: 100 MG/DL
RBC # BLD: 3.5 M/UL (ref 4.2–5.4)
RBC UA: NORMAL /HPF (ref 0–2)
SODIUM BLD-SCNC: 132 MEQ/L (ref 132–144)
SPECIFIC GRAVITY UA: 1.02 (ref 1–1.03)
TOTAL PROTEIN: 6.1 G/DL (ref 6.4–8.1)
URINE REFLEX TO CULTURE: YES
UROBILINOGEN, URINE: 0.2 E.U./DL
WBC # BLD: 11.2 K/UL (ref 4.8–10.8)
WBC UA: NORMAL /HPF (ref 0–5)

## 2018-10-21 PROCEDURE — 6370000000 HC RX 637 (ALT 250 FOR IP): Performed by: NURSE PRACTITIONER

## 2018-10-21 PROCEDURE — 83605 ASSAY OF LACTIC ACID: CPT

## 2018-10-21 PROCEDURE — 6360000002 HC RX W HCPCS: Performed by: PSYCHIATRY & NEUROLOGY

## 2018-10-21 PROCEDURE — 6370000000 HC RX 637 (ALT 250 FOR IP): Performed by: INTERNAL MEDICINE

## 2018-10-21 PROCEDURE — 87149 DNA/RNA DIRECT PROBE: CPT

## 2018-10-21 PROCEDURE — 87040 BLOOD CULTURE FOR BACTERIA: CPT

## 2018-10-21 PROCEDURE — 80053 COMPREHEN METABOLIC PANEL: CPT

## 2018-10-21 PROCEDURE — 1240000000 HC EMOTIONAL WELLNESS R&B

## 2018-10-21 PROCEDURE — 87147 CULTURE TYPE IMMUNOLOGIC: CPT

## 2018-10-21 PROCEDURE — 87077 CULTURE AEROBIC IDENTIFY: CPT

## 2018-10-21 PROCEDURE — 6370000000 HC RX 637 (ALT 250 FOR IP): Performed by: PSYCHIATRY & NEUROLOGY

## 2018-10-21 PROCEDURE — 87186 SC STD MICRODIL/AGAR DIL: CPT

## 2018-10-21 PROCEDURE — 87086 URINE CULTURE/COLONY COUNT: CPT

## 2018-10-21 PROCEDURE — 85025 COMPLETE CBC W/AUTO DIFF WBC: CPT

## 2018-10-21 PROCEDURE — 6370000000 HC RX 637 (ALT 250 FOR IP): Performed by: PHYSICIAN ASSISTANT

## 2018-10-21 PROCEDURE — 36415 COLL VENOUS BLD VENIPUNCTURE: CPT

## 2018-10-21 PROCEDURE — 83735 ASSAY OF MAGNESIUM: CPT

## 2018-10-21 PROCEDURE — 81001 URINALYSIS AUTO W/SCOPE: CPT

## 2018-10-21 PROCEDURE — 84100 ASSAY OF PHOSPHORUS: CPT

## 2018-10-21 RX ORDER — TRAMADOL HYDROCHLORIDE 50 MG/1
50 TABLET ORAL EVERY 6 HOURS PRN
Status: CANCELLED | OUTPATIENT
Start: 2018-10-21

## 2018-10-21 RX ORDER — VENLAFAXINE HYDROCHLORIDE 37.5 MG/1
37.5 CAPSULE, EXTENDED RELEASE ORAL
Status: CANCELLED | OUTPATIENT
Start: 2018-10-22

## 2018-10-21 RX ORDER — HYDROXYZINE PAMOATE 50 MG/1
50 CAPSULE ORAL EVERY 6 HOURS PRN
Status: CANCELLED | OUTPATIENT
Start: 2018-10-21

## 2018-10-21 RX ORDER — SODIUM CHLORIDE 0.9 % (FLUSH) 0.9 %
10 SYRINGE (ML) INJECTION EVERY 12 HOURS SCHEDULED
Status: CANCELLED | OUTPATIENT
Start: 2018-10-21

## 2018-10-21 RX ORDER — FENTANYL CITRATE 50 UG/ML
50 INJECTION, SOLUTION INTRAMUSCULAR; INTRAVENOUS
Status: CANCELLED | OUTPATIENT
Start: 2018-10-21

## 2018-10-21 RX ORDER — LIDOCAINE AND PRILOCAINE 25; 25 MG/G; MG/G
CREAM TOPICAL DAILY PRN
Status: CANCELLED | OUTPATIENT
Start: 2018-10-21

## 2018-10-21 RX ORDER — CHOLECALCIFEROL (VITAMIN D3) 10 MCG
1 TABLET ORAL DAILY
Status: CANCELLED | OUTPATIENT
Start: 2018-10-22

## 2018-10-21 RX ORDER — SIROLIMUS 1 MG/1
2 TABLET, FILM COATED ORAL DAILY
Status: CANCELLED | OUTPATIENT
Start: 2018-10-22

## 2018-10-21 RX ORDER — MIDAZOLAM HYDROCHLORIDE 1 MG/ML
2 INJECTION INTRAMUSCULAR; INTRAVENOUS
Status: CANCELLED | OUTPATIENT
Start: 2018-10-21

## 2018-10-21 RX ORDER — SODIUM BICARBONATE 650 MG/1
650 TABLET ORAL 3 TIMES DAILY
Status: CANCELLED | OUTPATIENT
Start: 2018-10-22

## 2018-10-21 RX ORDER — 0.9 % SODIUM CHLORIDE 0.9 %
1000 INTRAVENOUS SOLUTION INTRAVENOUS
Status: CANCELLED | OUTPATIENT
Start: 2018-10-21

## 2018-10-21 RX ORDER — CLONAZEPAM 0.5 MG/1
0.5 TABLET ORAL 2 TIMES DAILY
Status: CANCELLED | OUTPATIENT
Start: 2018-10-22

## 2018-10-21 RX ORDER — SEVELAMER CARBONATE 800 MG/1
1600 TABLET, FILM COATED ORAL
Status: DISCONTINUED | OUTPATIENT
Start: 2018-10-21 | End: 2018-10-22 | Stop reason: HOSPADM

## 2018-10-21 RX ORDER — SODIUM CHLORIDE 9 MG/ML
250 INJECTION, SOLUTION INTRAVENOUS
Status: CANCELLED | OUTPATIENT
Start: 2018-10-21

## 2018-10-21 RX ORDER — HALOPERIDOL 5 MG/ML
5 INJECTION INTRAMUSCULAR EVERY 4 HOURS PRN
Status: CANCELLED | OUTPATIENT
Start: 2018-10-21

## 2018-10-21 RX ORDER — HYDROXYZINE HYDROCHLORIDE 50 MG/ML
50 INJECTION, SOLUTION INTRAMUSCULAR EVERY 6 HOURS PRN
Status: CANCELLED | OUTPATIENT
Start: 2018-10-21

## 2018-10-21 RX ORDER — SEVELAMER CARBONATE 800 MG/1
1600 TABLET, FILM COATED ORAL
Status: CANCELLED | OUTPATIENT
Start: 2018-10-22

## 2018-10-21 RX ORDER — HEPARIN SODIUM 1000 [USP'U]/ML
1000 INJECTION, SOLUTION INTRAVENOUS; SUBCUTANEOUS PRN
Status: CANCELLED | OUTPATIENT
Start: 2018-10-21

## 2018-10-21 RX ORDER — BENZTROPINE MESYLATE 1 MG/ML
2 INJECTION INTRAMUSCULAR; INTRAVENOUS 2 TIMES DAILY PRN
Status: CANCELLED | OUTPATIENT
Start: 2018-10-21

## 2018-10-21 RX ORDER — SEVELAMER CARBONATE 800 MG/1
400 TABLET, FILM COATED ORAL
Status: CANCELLED | OUTPATIENT
Start: 2018-10-21

## 2018-10-21 RX ORDER — ACETAMINOPHEN 325 MG/1
650 TABLET ORAL EVERY 4 HOURS PRN
Status: DISCONTINUED | OUTPATIENT
Start: 2018-10-21 | End: 2018-10-22 | Stop reason: HOSPADM

## 2018-10-21 RX ORDER — MYCOPHENOLIC ACID 360 MG/1
180 TABLET, DELAYED RELEASE ORAL DAILY
Status: CANCELLED | OUTPATIENT
Start: 2018-10-22

## 2018-10-21 RX ORDER — LIDOCAINE HYDROCHLORIDE 20 MG/ML
5 INJECTION, SOLUTION INFILTRATION; PERINEURAL
Status: CANCELLED | OUTPATIENT
Start: 2018-10-21

## 2018-10-21 RX ORDER — SODIUM CHLORIDE 0.9 % (FLUSH) 0.9 %
10 SYRINGE (ML) INJECTION PRN
Status: CANCELLED | OUTPATIENT
Start: 2018-10-21

## 2018-10-21 RX ORDER — TRAZODONE HYDROCHLORIDE 50 MG/1
50 TABLET ORAL NIGHTLY
Status: CANCELLED | OUTPATIENT
Start: 2018-10-22

## 2018-10-21 RX ORDER — ONDANSETRON 4 MG/1
4 TABLET, ORALLY DISINTEGRATING ORAL EVERY 6 HOURS PRN
Status: DISCONTINUED | OUTPATIENT
Start: 2018-10-21 | End: 2018-10-22 | Stop reason: HOSPADM

## 2018-10-21 RX ORDER — SODIUM BICARBONATE 650 MG/1
650 TABLET ORAL 3 TIMES DAILY
Status: DISCONTINUED | OUTPATIENT
Start: 2018-10-21 | End: 2018-10-22 | Stop reason: HOSPADM

## 2018-10-21 RX ORDER — CHOLECALCIFEROL (VITAMIN D3) 10 MCG
1 TABLET ORAL DAILY
Status: DISCONTINUED | OUTPATIENT
Start: 2018-10-21 | End: 2018-10-22 | Stop reason: HOSPADM

## 2018-10-21 RX ORDER — HALOPERIDOL 5 MG
5 TABLET ORAL EVERY 4 HOURS PRN
Status: CANCELLED | OUTPATIENT
Start: 2018-10-21

## 2018-10-21 RX ORDER — HEPARIN SODIUM 5000 [USP'U]/ML
5000 INJECTION, SOLUTION INTRAVENOUS; SUBCUTANEOUS 2 TIMES DAILY
Status: CANCELLED | OUTPATIENT
Start: 2018-10-21

## 2018-10-21 RX ORDER — ACETAMINOPHEN 325 MG/1
650 TABLET ORAL EVERY 4 HOURS PRN
Status: CANCELLED | OUTPATIENT
Start: 2018-10-21

## 2018-10-21 RX ADMIN — ACETAMINOPHEN 650 MG: 325 TABLET ORAL at 19:14

## 2018-10-21 RX ADMIN — ACETAMINOPHEN 650 MG: 325 TABLET ORAL at 14:48

## 2018-10-21 RX ADMIN — ONDANSETRON 4 MG: 4 TABLET, ORALLY DISINTEGRATING ORAL at 15:51

## 2018-10-21 RX ADMIN — VENLAFAXINE HYDROCHLORIDE 37.5 MG: 37.5 CAPSULE, EXTENDED RELEASE ORAL at 10:01

## 2018-10-21 RX ADMIN — TRAMADOL HYDROCHLORIDE 50 MG: 50 TABLET, FILM COATED ORAL at 10:00

## 2018-10-21 RX ADMIN — SEVELAMER CARBONATE 1600 MG: 800 TABLET, FILM COATED ORAL at 13:30

## 2018-10-21 RX ADMIN — SIROLIMUS 2 MG: 1 TABLET, FILM COATED ORAL at 10:01

## 2018-10-21 RX ADMIN — TRAMADOL HYDROCHLORIDE 50 MG: 50 TABLET, FILM COATED ORAL at 16:12

## 2018-10-21 RX ADMIN — SEVELAMER CARBONATE 3200 MG: 800 TABLET, FILM COATED ORAL at 10:02

## 2018-10-21 RX ADMIN — Medication 1 MG: at 13:30

## 2018-10-21 RX ADMIN — ONDANSETRON 4 MG: 4 TABLET, ORALLY DISINTEGRATING ORAL at 09:35

## 2018-10-21 RX ADMIN — CLONAZEPAM 0.5 MG: 0.5 TABLET ORAL at 10:00

## 2018-10-21 RX ADMIN — SEVELAMER CARBONATE 1600 MG: 800 TABLET, FILM COATED ORAL at 17:25

## 2018-10-21 RX ADMIN — SODIUM BICARBONATE 650 MG TABLET 650 MG: at 21:38

## 2018-10-21 RX ADMIN — MYCOPHENOLIC ACID 180 MG: 360 TABLET, DELAYED RELEASE ORAL at 10:01

## 2018-10-21 RX ADMIN — SODIUM BICARBONATE 650 MG TABLET 650 MG: at 13:30

## 2018-10-21 ASSESSMENT — PAIN SCALES - GENERAL
PAINLEVEL_OUTOF10: 9
PAINLEVEL_OUTOF10: 8
PAINLEVEL_OUTOF10: 8
PAINLEVEL_OUTOF10: 9

## 2018-10-21 NOTE — CARE COORDINATION
Patient remain lethargic prior to lunch told staff she was feeling abit better Did not eat lunch Face looks puffier than this AM Patient has been picking at areas where fistula repair done Had removed DSD Patient had been touching purse sutures even though staff had cautioned her not to touch No significant chg in skin color no discharge Noted to be more swollen than yesterday Warm to touch Temp 102.9 ap129 /70 Perfect serve to hospitalist for further orders Patient noted to be having chills

## 2018-10-21 NOTE — CARE COORDINATION
Patient c/o not feeling well Had nausea and emesis earlier Zofran given with some relief Patient c/o pain all over Very lethargic Requesting pain med Ultram just 2 hours after receiving adose also asking for more Zofran too early Patient accepting that meds are not due to be given Patient states she feels this way sometimes prior to dialysis  And after, Awaiting lab results patient is easy to awaken Patient is eating lunch at this time Patient also relates had chills last night

## 2018-10-22 ENCOUNTER — APPOINTMENT (OUTPATIENT)
Dept: GENERAL RADIOLOGY | Age: 38
DRG: 314 | End: 2018-10-22
Attending: INTERNAL MEDICINE
Payer: COMMERCIAL

## 2018-10-22 ENCOUNTER — HOSPITAL ENCOUNTER (INPATIENT)
Age: 38
LOS: 2 days | Discharge: ANOTHER HOSPITAL | DRG: 314 | End: 2018-10-24
Attending: INTERNAL MEDICINE | Admitting: INTERNAL MEDICINE
Payer: COMMERCIAL

## 2018-10-22 ENCOUNTER — APPOINTMENT (OUTPATIENT)
Dept: ULTRASOUND IMAGING | Age: 38
DRG: 314 | End: 2018-10-22
Attending: INTERNAL MEDICINE
Payer: COMMERCIAL

## 2018-10-22 PROBLEM — T82.7XXA AV FISTULA INFECTION, INITIAL ENCOUNTER (HCC): Status: ACTIVE | Noted: 2018-10-22

## 2018-10-22 LAB
ALBUMIN SERPL-MCNC: 3.5 G/DL (ref 3.9–4.9)
ALP BLD-CCNC: 68 U/L (ref 40–130)
ALT SERPL-CCNC: 10 U/L (ref 0–33)
ANION GAP SERPL CALCULATED.3IONS-SCNC: 19 MEQ/L (ref 7–13)
AST SERPL-CCNC: 18 U/L (ref 0–35)
BILIRUB SERPL-MCNC: 0.5 MG/DL (ref 0–1.2)
BUN BLDV-MCNC: 65 MG/DL (ref 6–20)
CALCIUM SERPL-MCNC: 8.1 MG/DL (ref 8.6–10.2)
CHLORIDE BLD-SCNC: 94 MEQ/L (ref 98–107)
CO2: 19 MEQ/L (ref 22–29)
CREAT SERPL-MCNC: 9.13 MG/DL (ref 0.5–0.9)
GFR AFRICAN AMERICAN: 5.8
GFR NON-AFRICAN AMERICAN: 4.8
GLOBULIN: 2.4 G/DL (ref 2.3–3.5)
GLUCOSE BLD-MCNC: 101 MG/DL (ref 74–109)
IRON SATURATION: 10 % (ref 11–46)
IRON: 20 UG/DL (ref 37–145)
LACTIC ACID, SEPSIS: 0.7 MMOL/L (ref 0.5–1.9)
POTASSIUM REFLEX MAGNESIUM: 4.9 MEQ/L (ref 3.5–5.1)
SODIUM BLD-SCNC: 132 MEQ/L (ref 132–144)
TOTAL IRON BINDING CAPACITY: 204 UG/DL (ref 178–450)
TOTAL PROTEIN: 5.9 G/DL (ref 6.4–8.1)

## 2018-10-22 PROCEDURE — 6370000000 HC RX 637 (ALT 250 FOR IP): Performed by: PHYSICIAN ASSISTANT

## 2018-10-22 PROCEDURE — 36591 DRAW BLOOD OFF VENOUS DEVICE: CPT

## 2018-10-22 PROCEDURE — 99232 SBSQ HOSP IP/OBS MODERATE 35: CPT | Performed by: CLINICAL NURSE SPECIALIST

## 2018-10-22 PROCEDURE — 5A1D70Z PERFORMANCE OF URINARY FILTRATION, INTERMITTENT, LESS THAN 6 HOURS PER DAY: ICD-10-PCS | Performed by: INTERNAL MEDICINE

## 2018-10-22 PROCEDURE — 83605 ASSAY OF LACTIC ACID: CPT

## 2018-10-22 PROCEDURE — 80053 COMPREHEN METABOLIC PANEL: CPT

## 2018-10-22 PROCEDURE — 71046 X-RAY EXAM CHEST 2 VIEWS: CPT

## 2018-10-22 PROCEDURE — 6360000002 HC RX W HCPCS: Performed by: PHYSICIAN ASSISTANT

## 2018-10-22 PROCEDURE — 1210000000 HC MED SURG R&B

## 2018-10-22 PROCEDURE — 83550 IRON BINDING TEST: CPT

## 2018-10-22 PROCEDURE — 36415 COLL VENOUS BLD VENIPUNCTURE: CPT

## 2018-10-22 PROCEDURE — 87040 BLOOD CULTURE FOR BACTERIA: CPT

## 2018-10-22 PROCEDURE — 83540 ASSAY OF IRON: CPT

## 2018-10-22 PROCEDURE — 99222 1ST HOSP IP/OBS MODERATE 55: CPT | Performed by: INTERNAL MEDICINE

## 2018-10-22 PROCEDURE — 93971 EXTREMITY STUDY: CPT

## 2018-10-22 PROCEDURE — 99233 SBSQ HOSP IP/OBS HIGH 50: CPT | Performed by: RADIOLOGY

## 2018-10-22 PROCEDURE — 2580000003 HC RX 258: Performed by: PHYSICIAN ASSISTANT

## 2018-10-22 RX ORDER — TRAMADOL HYDROCHLORIDE 50 MG/1
50 TABLET ORAL EVERY 6 HOURS PRN
Status: COMPLETED | OUTPATIENT
Start: 2018-10-22 | End: 2018-10-22

## 2018-10-22 RX ORDER — MIDAZOLAM HYDROCHLORIDE 1 MG/ML
2 INJECTION INTRAMUSCULAR; INTRAVENOUS
Status: DISCONTINUED | OUTPATIENT
Start: 2018-10-22 | End: 2018-10-22 | Stop reason: ALTCHOICE

## 2018-10-22 RX ORDER — FENTANYL CITRATE 50 UG/ML
50 INJECTION, SOLUTION INTRAMUSCULAR; INTRAVENOUS
Status: DISCONTINUED | OUTPATIENT
Start: 2018-10-22 | End: 2018-10-22 | Stop reason: ALTCHOICE

## 2018-10-22 RX ORDER — VANCOMYCIN HYDROCHLORIDE 500 MG/100ML
500 INJECTION, SOLUTION INTRAVENOUS
Status: DISCONTINUED | OUTPATIENT
Start: 2018-10-22 | End: 2018-10-22 | Stop reason: HOSPADM

## 2018-10-22 RX ORDER — SODIUM CHLORIDE 9 MG/ML
250 INJECTION, SOLUTION INTRAVENOUS
Status: DISCONTINUED | OUTPATIENT
Start: 2018-10-22 | End: 2018-10-22 | Stop reason: ALTCHOICE

## 2018-10-22 RX ORDER — LIDOCAINE AND PRILOCAINE 25; 25 MG/G; MG/G
CREAM TOPICAL DAILY PRN
Status: DISCONTINUED | OUTPATIENT
Start: 2018-10-22 | End: 2018-10-24 | Stop reason: HOSPADM

## 2018-10-22 RX ORDER — ACETAMINOPHEN 325 MG/1
650 TABLET ORAL EVERY 4 HOURS PRN
Status: DISCONTINUED | OUTPATIENT
Start: 2018-10-22 | End: 2018-10-24 | Stop reason: HOSPADM

## 2018-10-22 RX ORDER — 0.9 % SODIUM CHLORIDE 0.9 %
1000 INTRAVENOUS SOLUTION INTRAVENOUS
Status: DISCONTINUED | OUTPATIENT
Start: 2018-10-22 | End: 2018-10-22 | Stop reason: ALTCHOICE

## 2018-10-22 RX ORDER — SIROLIMUS 1 MG/1
2 TABLET, FILM COATED ORAL DAILY
Status: DISCONTINUED | OUTPATIENT
Start: 2018-10-22 | End: 2018-10-22

## 2018-10-22 RX ORDER — SODIUM BICARBONATE 650 MG/1
650 TABLET ORAL 3 TIMES DAILY
Status: DISCONTINUED | OUTPATIENT
Start: 2018-10-22 | End: 2018-10-24 | Stop reason: HOSPADM

## 2018-10-22 RX ORDER — LIDOCAINE HYDROCHLORIDE 20 MG/ML
5 INJECTION, SOLUTION INFILTRATION; PERINEURAL
Status: DISCONTINUED | OUTPATIENT
Start: 2018-10-22 | End: 2018-10-22 | Stop reason: ALTCHOICE

## 2018-10-22 RX ORDER — CHOLECALCIFEROL (VITAMIN D3) 10 MCG
1 TABLET ORAL DAILY
Status: DISCONTINUED | OUTPATIENT
Start: 2018-10-22 | End: 2018-10-24 | Stop reason: HOSPADM

## 2018-10-22 RX ORDER — HYDROXYZINE PAMOATE 50 MG/1
50 CAPSULE ORAL EVERY 6 HOURS PRN
Status: DISCONTINUED | OUTPATIENT
Start: 2018-10-22 | End: 2018-10-24 | Stop reason: HOSPADM

## 2018-10-22 RX ORDER — SEVELAMER CARBONATE 800 MG/1
400 TABLET, FILM COATED ORAL
Status: DISCONTINUED | OUTPATIENT
Start: 2018-10-22 | End: 2018-10-24 | Stop reason: HOSPADM

## 2018-10-22 RX ORDER — BENZTROPINE MESYLATE 1 MG/ML
2 INJECTION INTRAMUSCULAR; INTRAVENOUS 2 TIMES DAILY PRN
Status: DISCONTINUED | OUTPATIENT
Start: 2018-10-22 | End: 2018-10-24 | Stop reason: HOSPADM

## 2018-10-22 RX ORDER — SODIUM CHLORIDE 0.9 % (FLUSH) 0.9 %
10 SYRINGE (ML) INJECTION PRN
Status: DISCONTINUED | OUTPATIENT
Start: 2018-10-22 | End: 2018-10-24 | Stop reason: HOSPADM

## 2018-10-22 RX ORDER — HYDROXYZINE HYDROCHLORIDE 50 MG/ML
50 INJECTION, SOLUTION INTRAMUSCULAR EVERY 6 HOURS PRN
Status: DISCONTINUED | OUTPATIENT
Start: 2018-10-22 | End: 2018-10-24 | Stop reason: HOSPADM

## 2018-10-22 RX ORDER — HEPARIN SODIUM 1000 [USP'U]/ML
1000 INJECTION, SOLUTION INTRAVENOUS; SUBCUTANEOUS PRN
Status: DISCONTINUED | OUTPATIENT
Start: 2018-10-22 | End: 2018-10-24 | Stop reason: HOSPADM

## 2018-10-22 RX ORDER — SIROLIMUS 1 MG/1
1 TABLET, FILM COATED ORAL DAILY
Status: DISCONTINUED | OUTPATIENT
Start: 2018-10-23 | End: 2018-10-24 | Stop reason: HOSPADM

## 2018-10-22 RX ORDER — TRAZODONE HYDROCHLORIDE 50 MG/1
50 TABLET ORAL NIGHTLY
Status: DISCONTINUED | OUTPATIENT
Start: 2018-10-22 | End: 2018-10-24 | Stop reason: HOSPADM

## 2018-10-22 RX ORDER — MYCOPHENOLIC ACID 360 MG/1
180 TABLET, DELAYED RELEASE ORAL DAILY
Status: DISCONTINUED | OUTPATIENT
Start: 2018-10-23 | End: 2018-10-23

## 2018-10-22 RX ORDER — VANCOMYCIN HYDROCHLORIDE 500 MG/100ML
500 INJECTION, SOLUTION INTRAVENOUS
Status: DISCONTINUED | OUTPATIENT
Start: 2018-10-22 | End: 2018-10-24 | Stop reason: HOSPADM

## 2018-10-22 RX ORDER — CLONAZEPAM 0.5 MG/1
0.5 TABLET ORAL 2 TIMES DAILY
Status: DISCONTINUED | OUTPATIENT
Start: 2018-10-22 | End: 2018-10-24 | Stop reason: HOSPADM

## 2018-10-22 RX ORDER — HALOPERIDOL 5 MG/ML
5 INJECTION INTRAMUSCULAR EVERY 4 HOURS PRN
Status: DISCONTINUED | OUTPATIENT
Start: 2018-10-22 | End: 2018-10-24 | Stop reason: HOSPADM

## 2018-10-22 RX ORDER — SEVELAMER CARBONATE 800 MG/1
1600 TABLET, FILM COATED ORAL
Status: DISCONTINUED | OUTPATIENT
Start: 2018-10-22 | End: 2018-10-24 | Stop reason: HOSPADM

## 2018-10-22 RX ORDER — MYCOPHENOLIC ACID 180 MG/1
180 TABLET, DELAYED RELEASE ORAL DAILY
Status: DISCONTINUED | OUTPATIENT
Start: 2018-10-22 | End: 2018-10-22

## 2018-10-22 RX ORDER — HEPARIN SODIUM 5000 [USP'U]/ML
5000 INJECTION, SOLUTION INTRAVENOUS; SUBCUTANEOUS 2 TIMES DAILY
Status: DISCONTINUED | OUTPATIENT
Start: 2018-10-22 | End: 2018-10-24 | Stop reason: HOSPADM

## 2018-10-22 RX ORDER — VENLAFAXINE HYDROCHLORIDE 37.5 MG/1
37.5 CAPSULE, EXTENDED RELEASE ORAL
Status: DISCONTINUED | OUTPATIENT
Start: 2018-10-22 | End: 2018-10-24 | Stop reason: HOSPADM

## 2018-10-22 RX ORDER — SODIUM CHLORIDE 0.9 % (FLUSH) 0.9 %
10 SYRINGE (ML) INJECTION EVERY 12 HOURS SCHEDULED
Status: DISCONTINUED | OUTPATIENT
Start: 2018-10-22 | End: 2018-10-24 | Stop reason: HOSPADM

## 2018-10-22 RX ADMIN — TRAMADOL HYDROCHLORIDE 50 MG: 50 TABLET, FILM COATED ORAL at 23:02

## 2018-10-22 RX ADMIN — HEPARIN SODIUM 2000 UNITS: 1000 INJECTION, SOLUTION INTRAVENOUS; SUBCUTANEOUS at 09:59

## 2018-10-22 RX ADMIN — PIPERACILLIN SODIUM,TAZOBACTAM SODIUM 3.38 G: 3; .375 INJECTION, POWDER, FOR SOLUTION INTRAVENOUS at 17:51

## 2018-10-22 RX ADMIN — HYDROXYZINE HYDROCHLORIDE 50 MG: 50 INJECTION, SOLUTION INTRAMUSCULAR at 13:05

## 2018-10-22 RX ADMIN — PIPERACILLIN SODIUM,TAZOBACTAM SODIUM 3.38 G: 3; .375 INJECTION, POWDER, FOR SOLUTION INTRAVENOUS at 03:49

## 2018-10-22 RX ADMIN — VANCOMYCIN HYDROCHLORIDE 750 MG: 750 INJECTION, POWDER, LYOPHILIZED, FOR SOLUTION INTRAVENOUS at 01:38

## 2018-10-22 RX ADMIN — VANCOMYCIN HYDROCHLORIDE 500 MG: 500 INJECTION, SOLUTION INTRAVENOUS at 16:56

## 2018-10-22 RX ADMIN — VENLAFAXINE HYDROCHLORIDE 37.5 MG: 37.5 CAPSULE, EXTENDED RELEASE ORAL at 16:56

## 2018-10-22 RX ADMIN — ACETAMINOPHEN 650 MG: 325 TABLET ORAL at 05:35

## 2018-10-22 RX ADMIN — ACETAMINOPHEN 650 MG: 325 TABLET ORAL at 01:00

## 2018-10-22 RX ADMIN — SODIUM BICARBONATE 650 MG: 650 TABLET ORAL at 16:56

## 2018-10-22 RX ADMIN — SEVELAMER CARBONATE 1600 MG: 800 TABLET, FILM COATED ORAL at 16:56

## 2018-10-22 RX ADMIN — Medication 1 MG: at 16:56

## 2018-10-22 RX ADMIN — CLONAZEPAM 0.5 MG: 0.5 TABLET ORAL at 23:03

## 2018-10-22 RX ADMIN — Medication 10 ML: at 23:03

## 2018-10-22 RX ADMIN — TRAZODONE HYDROCHLORIDE 50 MG: 50 TABLET ORAL at 23:02

## 2018-10-22 RX ADMIN — SODIUM BICARBONATE 650 MG: 650 TABLET ORAL at 23:03

## 2018-10-22 RX ADMIN — HEPARIN SODIUM 1000 UNITS: 1000 INJECTION, SOLUTION INTRAVENOUS; SUBCUTANEOUS at 12:16

## 2018-10-22 RX ADMIN — HEPARIN SODIUM 5000 UNITS: 5000 INJECTION, SOLUTION INTRAVENOUS; SUBCUTANEOUS at 23:06

## 2018-10-22 ASSESSMENT — PAIN SCALES - GENERAL: PAINLEVEL_OUTOF10: 10

## 2018-10-22 ASSESSMENT — ENCOUNTER SYMPTOMS
WHEEZING: 0
PHOTOPHOBIA: 0
EYES NEGATIVE: 1
HEARTBURN: 0
VOMITING: 0
DIARRHEA: 0
NAUSEA: 1
CONSTIPATION: 0
BLURRED VISION: 0
ABDOMINAL PAIN: 0
GASTROINTESTINAL NEGATIVE: 1
SHORTNESS OF BREATH: 0
BACK PAIN: 0
COUGH: 0
HEMOPTYSIS: 0
SPUTUM PRODUCTION: 0
NAUSEA: 0
DOUBLE VISION: 0
RESPIRATORY NEGATIVE: 1

## 2018-10-22 NOTE — PROGRESS NOTES
Dr. Santiago Ambriz notified regarding pt transfer to medical floor per hospitalist d/t continued fever, redness at AV fistula site, order to discharge received.  Electronically signed by Rosalba Vanegas RN on 10/21/18 at 10:15 PM   .
Nutrition Assessment    Type and Reason for Visit: Initial, Positive Nutrition Screen (wt loss)    Malnutrition Assessment:  · Malnutrition Status: Insufficient data    Nutrition Diagnosis:   · Problem: No nutrition diagnosis at this time  · Etiology:      Signs and symptoms:      Nutrition Assessment:  · Subjective Assessment: Pt currently @ procedure-fistulogram to remove blockage from fistula. Per chart review, pt reported she has lost weight with HD over the past week. Noted reports of good po intake. Weight loss likely due to fluid shifts. Nutrition Risk Level   Risk Level: Low    Nutrition Intervention  Food and/or Delivery:  (Resume General diet with 1200 ml fluid restriction post-op.)  Nutrition Education/Counseling/Coordination of Care:       Patient assessed for nutrition risk. Deemed to be at low risk at this time. Will continue to follow patient.       Electronically signed by Briseida Wesley RD, LD on 10/19/18 at 3:39 PM
Patient mood labile related to  Aspects of her care Patient has told staff that she does things different with meds and  Such at home and she has blessing of her renal team Consult with nephrology ordered to clarify patient treatment Becomes upset when staff unable to accommodate her needs do to not having orders that match her needs
Pharmacy Dose Adjustment Per Protocol    Farooq Frank is a 42 y.o. female. Medication Ordered: Zosyn 2.25 gm IV every 8 hours, 30 minute infusion    No results for input(s): BUN in the last 72 hours. No results for input(s): CREATININE in the last 72 hours. Estimated Creatinine Clearance: 5 mL/min (A) (based on SCr of 11.15 mg/dL [facility])). Height:   Ht Readings from Last 1 Encounters:   10/19/18 5' 1.5\" (1.562 m)     Weight:  Wt Readings from Last 1 Encounters:   10/19/18 122 lb 5.7 oz (55.5 kg)         Piperacillin/Tazobactam [Zosyn]      Traditional Dosing Change to Extended Infusion:   CrCl ?20 ml/min ? Zosyn 2.25 gm q6-8 hr Zosyn 3.375 gm IV q8h, infuse over 4 hr     Zosyn 3.375 gm q6-8 hr      Zosyn 4.5 gm q6-8 hr    CrCl <20 ml/min or ESRD ?  Zosyn 2.25 gm q6-8 hr Zosyn 3.375 gm IV q12h, infuse over 4 hr     Zosyn 3.375 gm q6-8 hr      Zosyn 4.5 gm q6-8 hr
Pt lethargic, in bed, temp 102.6. Interior antecubital area reddened, warm, and swollen. Hospitalist notified.
Report given to dialysis nurse.
Skin check complete and is negative. Oriented patient to the unit and gave patient mesh underwear, shower shoes and water. Florinda Viveros LPN called dietary to get pt a lunch tray. Pt states she has anxiety and needs something for it. This nurse explained to pt that we are waiting for her orders to go through in the computer. Pt verbalized understanding and is agreeable. Denies further needs at this time.
110   Temp 102.6 °F (39.2 °C) (Oral) Comment: RN aware  Resp 20   Ht 5' 1.5\" (1.562 m)   Wt 122 lb 5.7 oz (55.5 kg)   SpO2 98%   BMI 22.74 kg/m²   Body mass index is 22.74 kg/m². CONSTITUTIONAL:  somnolent and moderate distress  EYES:  pupils equal, round and reactive to light, sclera clear and conjunctiva normal  ENT:  normocepalic, without obvious abnormality, atraumatic, fair dentition, dry mucosa  NECK:  supple, symmetrical, trachea midline, skin normal and no carotid bruits  LUNGS:  no increased work of breathing  CARDIOVASCULAR:  tachycardic with regular rhythm  ABDOMEN:  normal bowel sounds and non-tender  MUSCULOSKELETAL:  Erythema to ventral surface of LUE with edema and warmth, + distal pulse, no pedal edema, + distal pulses  NEUROLOGIC:  Mental Status Exam:  Level of Alertness:   lethargic  Orientation:   person, place, time  Cranial Nerves:  cranial nerves II-XII are grossly intact  SKIN:  As described above    DATA:     Recent Results (from the past 24 hour(s))   Phosphorus    Collection Time: 10/21/18 10:54 AM   Result Value Ref Range    Phosphorus 5.5 (H) 2.5 - 4.5 mg/dL       ASSESSMENT AND PLAN   1. Fever after recent vascular procedure  2. Suspected infection of fistulagram  3. ESRD on dialysis s/p renal transplant  4. Hx of recurrent pyelonephritis  5. Depression  6. Suicidal ideation      Admit to medical floor w/tele  Consult renal, ID, psych  US LUE stat to r/o DVT  UA + c/s STAT, cbc, cmp, Mg now and in am  Start vancomycin and zosyn (pharm to renal dose)  Blood cultures pending  May need to remove Permcath and culture tip  SITTER AT BEDSIDE  FULL H&P TO FOLLOW  Pt was seen and evaluated and discussed care with PA.  Agree with assessment and plan  Electronically signed by Carmen Persaud MD on 10/22/18 at 12:57 AM        SIGNATURE: Sarbjit May PA-C PATIENT NAME: Jeffery Holt   DATE: October 21, 2018 MRN: [id number]   TIME: 10:24 PM PAGER: [phone]     Dr. Simone Mast MD,
N/A    Number of children: N/A    Years of education: N/A     Occupational History    Not on file. Social History Main Topics    Smoking status: Current Every Day Smoker     Packs/day: 0.25     Years: 15.00     Types: Cigarettes    Smokeless tobacco: Never Used    Alcohol use No    Drug use: No    Sexual activity: Yes     Partners: Male     Other Topics Concern    Not on file     Social History Narrative    No narrative on file       LABS:  Lab Results   Component Value Date     10/17/2018    BUN 56 (H) 10/17/2018    CREATININE 11.15 (HH) 10/17/2018    WBC 9.1 10/17/2018     No results found for: PHENYTOIN, PHENOBARB, VALPROATE, CBMZ  No results found for: INR, PROTIME  No results found for: APTT  No results for input(s): ETOH in the last 72 hours. ROS:  [x] All negative/unchanged except if checked.  Explain positive(checked items) below:  [] Constitutional  [] Eyes  [] Ear/Nose/Mouth/Throat  [] Respiratory  [] CV  [] GI  []   [] Musculoskeletal  [] Skin/Breast  [] Neurological  [] Endocrine  [] Heme/Lymph  [] Allergic/Immunologic    Explanation:     MEDICATIONS:    Current Facility-Administered Medications:     ondansetron (ZOFRAN-ODT) disintegrating tablet 4 mg, 4 mg, Oral, Q6H PRN, Paul Valverde MD, 4 mg at 10/21/18 1551    sevelamer (RENVELA) tablet 1,600 mg, 1,600 mg, Oral, TID WC, [place] Peek Bescak, DO, 1,600 mg at 10/21/18 1725    sodium bicarbonate tablet 650 mg, 650 mg, Oral, TID, Travis Peek Bescak, DO, 650 mg at 10/21/18 1330    b complex-C-folic acid (NEPHROCAPS) capsule 1 mg, 1 capsule, Oral, Daily, Travis Peek Bescak, DO, 1 mg at 10/21/18 1330    acetaminophen (TYLENOL) tablet 650 mg, 650 mg, Oral, Q4H PRN, Ardia Iba, APRN - CNP, 650 mg at 10/21/18 1448    lidocaine-prilocaine (EMLA) cream, , Topical, Daily PRN, Ardia Iba, APRN - CNP    traMADol (ULTRAM) tablet 50 mg, 50 mg, Oral, Q6H PRN, iRna Tucker PA-C, 50 mg at 10/21/18 7722    0.9 % sodium chloride
Single     Spouse name: N/A    Number of children: N/A    Years of education: N/A     Occupational History    Not on file. Social History Main Topics    Smoking status: Current Every Day Smoker     Packs/day: 0.25     Years: 15.00     Types: Cigarettes    Smokeless tobacco: Never Used    Alcohol use No    Drug use: No    Sexual activity: Yes     Partners: Male     Other Topics Concern    Not on file     Social History Narrative    No narrative on file       LABS:  Lab Results   Component Value Date     10/17/2018    BUN 56 (H) 10/17/2018    CREATININE 11.15 (HH) 10/17/2018    WBC 9.1 10/17/2018     No results found for: PHENYTOIN, PHENOBARB, VALPROATE, CBMZ  No results found for: INR, PROTIME  No results found for: APTT  Recent Labs      10/17/18   1945   ETOH  <10           ROS:  [x] All negative/unchanged except if checked.  Explain positive(checked items) below:  [] Constitutional  [] Eyes  [] Ear/Nose/Mouth/Throat  [] Respiratory  [] CV  [] GI  []   [] Musculoskeletal  [] Skin/Breast  [] Neurological  [] Endocrine  [] Heme/Lymph  [] Allergic/Immunologic    Explanation:     MEDICATIONS:    Current Facility-Administered Medications:     0.9 % sodium chloride bolus, 1,000 mL, Intravenous, PRN, BRITTANY Chen CNP, Last Rate: 500 mL/hr at 10/19/18 1407, 250 mL at 10/19/18 1407    fentaNYL (SUBLIMAZE) injection 50 mcg, 50 mcg, Intravenous, PRN, BRITTANY Chen - CNP, 0.25 mcg at 10/19/18 1528    heparin (porcine) injection 6,000 Units, 6,000 Units, Intravenous, Once, BRITTANY Chen - CNP    lidocaine 2 % injection 5 mL, 5 mL, Intradermal, PRN, BRITTANY Chen - CNP, 5 mL at 10/19/18 1536    midazolam (VERSED) injection 2 mg, 2 mg, Intravenous, PRN, BRITTANY Chen - CNP, 0.25 mg at 10/19/18 1522    clonazePAM (KLONOPIN) tablet 0.5 mg, 0.5 mg, Oral, BID, Candice Higgins MD, 0.5 mg at 10/20/18 0812    venlafaxine (EFFEXOR XR) extended release capsule

## 2018-10-22 NOTE — PROGRESS NOTES
Pt was sleeping at 0730, vitals taken; temp was 99.8. Pt laying supine, until breakfast arrived at 0755. Pt is not eating, sleeping upright in bed. Calm and alert.

## 2018-10-22 NOTE — PROGRESS NOTES
Pharmacy Note  Vancomycin Consult     Felipe Arboleda is a 42 y.o. female started on Vancomycin for suspected fistulagram infection; consult received from Nilton Balderrama PA-C to manage therapy. Also receiving the following antibiotics: Zosyn. Patient Active Problem List   Diagnosis    MDD (major depressive disorder), recurrent severe, without psychosis ([facility] [address]    Major depressive disorder, severe ([facility] [address]    Fever         Allergies:  Dye [iodides] and Percocet [oxycodone-acetaminophen]      Temp max: 102.9 degrees F         Recent Labs      10/21/18   2239   BUN  60*             Recent Labs      10/21/18   2239   CREATININE  8.29*             Recent Labs      10/21/18   2240   WBC  11.2*         No intake or output data in the 24 hours ending 10/21/18 2334     Culture Date      Source                       Results  10/21/18             Blood x2                      Pending  10/21/18             Urine                           Pending            Ht Readings from Last 1 Encounters:   10/19/18 5' 1.5\" (1.562 m)            Wt Readings from Last 1 Encounters:   10/19/18 122 lb 5.7 oz (55.5 kg)          Body mass index is 22.74 kg/m². Estimated Creatinine Clearance: 7 mL/min (A) (based on SCr of 8.29 mg/dL [facility])). Assessment/Plan:  TBW: 55.5 kg   IBW: 49 kg   ABW: N/A  Patient is ESRD with dialysis currently scheduled for MWF. Will initiate vancomycin 750 mg (15 mg/kg per TBW) IV x1 dose. Then Vancomycin 500 mg IV given immediately AFTER each HD treatment. Initial trough scheduled for BEFORE HD (3rd dose) on Wednesday 10/24. Timing of future trough levels will be determined based on culture results, renal function, and clinical response. Thank you for the consult. Will continue to follow.   Olga Estrella PharmD [facility] 10/21/2018 11:55 PM

## 2018-10-22 NOTE — PROGRESS NOTES
Pharmacy Dose Adjustment Per Protocol    Nichole Escalante is a 42 y.o. female. Medication Ordered: 2.25 gm IV every 8 hours, 30 minutes infusion    Recent Labs      10/21/18   2239   BUN  60*       Recent Labs      10/21/18   2239   CREATININE  8.29*       Estimated Creatinine Clearance: 8 mL/min (A) (based on SCr of 8.29 mg/dL [facility])). Height:   Ht Readings from Last 1 Encounters:   10/22/18 5' 1\" (1.549 m)     Weight:  Wt Readings from Last 1 Encounters:   10/22/18 137 lb 2 oz (62.2 kg)         Piperacillin/Tazobactam [Zosyn]      Traditional Dosing Change to Extended Infusion:   CrCl ?20 ml/min ? Zosyn 2.25 gm q6-8 hr Zosyn 3.375 gm IV q8h, infuse over 4 hr     Zosyn 3.375 gm q6-8 hr      Zosyn 4.5 gm q6-8 hr    CrCl <20 ml/min or ESRD ? Zosyn 2.25 gm q6-8 hr Zosyn 3.375 gm IV q12h, infuse over 4 hr     Zosyn 3.375 gm q6-8 hr      Zosyn 4.5 gm q6-8 hr      Thank you for the consult, will continue to follow.   Rufino Arzola PharmD [facility] 10/22/2018 12:45 AM

## 2018-10-22 NOTE — CONSULTS
Historical Provider, MD   sevelamer (RENVELA) 800 MG tablet Take 200 mg by mouth take with snacks    Historical Provider, MD   sodium bicarbonate 650 MG tablet Take 650 mg by mouth 4 times daily    Historical Provider, MD   gabapentin (NEURONTIN) 100 MG capsule Take 100 mg by mouth nightly. Jack Gil Historical Provider, MD   sirolimus (RAPAMUNE) 1 MG tablet Take 2 mg by mouth daily  8/7/14   Historical Provider, MD        Family History:   Family History   Problem Relation Age of Onset    Heart Disease Mother     Diabetes Mother     Other Mother         LIVER DISEASE, INTERNAL BLEEDING    High Blood Pressure Father     Other Father         COPD, AGENT ORANGE    Other Brother         OBESITY      Social History:    Social History     Social History    Marital status: Single     Spouse name: N/A    Number of children: N/A    Years of education: N/A     Occupational History    Not on file. Social History Main Topics    Smoking status: Current Every Day Smoker     Packs/day: 0.25     Years: 15.00     Types: Cigarettes    Smokeless tobacco: Never Used    Alcohol use No    Drug use: No    Sexual activity: Yes     Partners: Male     Other Topics Concern    Not on file     Social History Narrative    No narrative on file        Review of Systems:  Review of Systems   Constitutional: Positive for malaise/fatigue. Negative for chills, diaphoresis, fever and weight loss. HENT: Negative. Negative for congestion, ear pain, hearing loss and tinnitus. Eyes: Negative. Negative for blurred vision, double vision and photophobia. Respiratory: Negative. Negative for cough, hemoptysis, sputum production, shortness of breath and wheezing. Cardiovascular: Negative. Negative for chest pain, palpitations, claudication and leg swelling. Gastrointestinal: Negative. Negative for abdominal pain, constipation, diarrhea, heartburn, nausea and vomiting. Genitourinary: Negative.   Negative for dysuria, flank pain, anxious. Her speech is rapid and/or pressured. She is agitated. ASSESSMENT AND PLAN:     ASSESSMENT: Left upper extremity AV graft without any blood flow    PLAN: Fistulogram with thrombectomy and angioplasty        Nolan Hernandez

## 2018-10-22 NOTE — FLOWSHEET NOTE
10/22/18 1407   Vital Signs   BP (!) 147/110   Temp 98.8 °F (37.1 °C)   Pulse 122   Weight 131 lb 9.8 oz (59.7 kg)   Weight Method Bed scale   Percent Weight Change -1.49   Pain Assessment   Pain Assessment 0-10   Post-Hemodialysis Assessment   Post-Treatment Procedures Blood returned;Catheter capped, clamped and heparinized x 2 ports   Machine Disinfection Process Acid/Vinegar Clean;Heat Disinfect   Rinseback Volume (ml) 250 ml   Total Liters Processed (l/min) 75.1 l/min   Dialyzer Clearance Clear   Duration of Treatment (minutes) 240 minutes   Heparin amount administered during treatment (units) 3000 units   Hemodialysis Output (ml) 1800 ml   NET Removed (ml) 1560 ml   Tolerated Treatment Good   Patient Response to Treatment pt became nauseaed and anxious. RN assessed and treated   Bilateral Breath Sounds Clear   Edema None   Physician Notified?  Yes

## 2018-10-22 NOTE — H&P
Ángel Hutson is an 42 y.o.  female.      INTERVAL HPI: Called to examine pt d/t persistent fever now at 102.6 after receiving Tylenol on [address] ( psychiatric floor), pt was admitted to psych floor due to severe depression and suicidal idealation and non complaint with medical treatment such her HD, Pt is ill appearing and c/o chills/sweats and pain to LUE. She has been tachycardic today and she reports NVD this morning upon awakening but none since. Extremity is swollen, warm and with erythema to ventral surface. Pt had recent fistulogram on 10/19/18 since she was told her fistula was not working during HD. Pt is currently receives dialysis on MWF and is for dialysis tomorrow, She denies CP, SOB, abd pain. Pt Had kidney transplant in  due to underlying urologic issues. She has a right-sided PermCath but has developing left AV fistula  No current facility-administered medications on file prior to encounter. Current Outpatient Prescriptions on File Prior to Encounter   Medication Sig Dispense Refill    mycophenolate (MYFORTIC) 180 MG DR tablet Take 180 mg by mouth daily      potassium chloride (MICRO-K) 10 MEQ extended release capsule Take 20 mEq by mouth three times a week      sevelamer (RENVELA) 800 MG tablet Take 4 tablets by mouth 3 times daily (with meals)      sevelamer (RENVELA) 800 MG tablet Take 200 mg by mouth take with snacks      sodium bicarbonate 650 MG tablet Take 650 mg by mouth 4 times daily      gabapentin (NEURONTIN) 100 MG capsule Take 100 mg by mouth nightly. Reina Tomas sirolimus (RAPAMUNE) 1 MG tablet Take 2 mg by mouth daily          Past Medical History:   Diagnosis Date    Chronic kidney disease (CKD)     Gouty arthritis     History of renal transplant     Pyelonephritis     Recurrent pyelonephritis     Recurrent urinary tract infection      Past Surgical History:   Procedure Laterality Date     SECTION      HYSTERECTOMY Bilateral 2011    KIDNEY
Level of Alertness:   lethargic  Orientation:   person, place, time  Cranial Nerves:  cranial nerves II-XII are grossly intact  SKIN:  As described above    DATA:     Diagnostic tests reviewed for today's visit:    Most recent labs and imaging results reviewed.      LABS:    Recent Results (from the past 24 hour(s))   Phosphorus    Collection Time: 10/21/18 10:54 AM   Result Value Ref Range    Phosphorus 5.5 (H) 2.5 - 4.5 mg/dL   Lactic Acid, Plasma    Collection Time: 10/21/18 10:39 PM   Result Value Ref Range    Lactic Acid 1.2 0.5 - 2.2 mmol/L   Comprehensive Metabolic Panel    Collection Time: 10/21/18 10:39 PM   Result Value Ref Range    Sodium 132 132 - 144 mEq/L    Potassium 5.0 3.5 - 5.1 mEq/L    Chloride 94 (L) 98 - 107 mEq/L    CO2 20 (L) 22 - 29 mEq/L    Anion Gap 18 (H) 7 - 13 mEq/L    Glucose 89 74 - 109 mg/dL    BUN 60 (H) 6 - 20 mg/dL    CREATININE 8.29 (HH) 0.50 - 0.90 mg/dL    GFR Non-African American 5.4 (L) >60    GFR  6.5 (L) >60    Calcium 8.3 (L) 8.6 - 10.2 mg/dL    Total Protein 6.1 (L) 6.4 - 8.1 g/dL    Alb 3.6 (L) 3.9 - 4.9 g/dL    Total Bilirubin 0.3 0.0 - 1.2 mg/dL    Alkaline Phosphatase 64 40 - 130 U/L    ALT 9 0 - 33 U/L    AST 15 0 - 35 U/L    Globulin 2.5 2.3 - 3.5 g/dL   MAGNESIUM    Collection Time: 10/21/18 10:39 PM   Result Value Ref Range    Magnesium 1.7 1.7 - 2.3 mg/dL   CBC Auto Differential    Collection Time: 10/21/18 10:40 PM   Result Value Ref Range    WBC 11.2 (H) 4.8 - 10.8 K/uL    RBC 3.50 (L) 4.20 - 5.40 M/uL    Hemoglobin 10.9 (L) 12.0 - 16.0 g/dL    Hematocrit 32.7 (L) 37.0 - 47.0 %    MCV 93.5 82.0 - 100.0 fL    MCH 31.0 27.0 - 31.3 pg    MCHC 33.2 33.0 - 37.0 %    RDW 18.8 (H) 11.5 - 14.5 %    Platelets 249 (L) 460 - 400 K/uL    Neutrophils % 91.5 %    Lymphocytes % 4.8 %    Monocytes % 3.2 %    Eosinophils % 0.1 %    Basophils % 0.4 %    Neutrophils # 10.3 (H) 1.4 - 6.5 K/uL    Lymphocytes # 0.5 (L) 1.0 - 4.8 K/uL    Monocytes # 0.4 0.2 - 0.8 K/uL

## 2018-10-22 NOTE — PROGRESS NOTES
Got a call from lab positive 2/2 blood cultures for MRSA. Informed Dr. Angie Khan who stated we were covered with vancomycin. No further orders given. Will continue to monitor.

## 2018-10-23 LAB
BASOPHILS ABSOLUTE: 0.1 K/UL (ref 0–0.2)
BASOPHILS RELATIVE PERCENT: 1.2 %
EOSINOPHILS ABSOLUTE: 0 K/UL (ref 0–0.7)
EOSINOPHILS RELATIVE PERCENT: 0.3 %
HCT VFR BLD CALC: 33.5 % (ref 37–47)
HEMOGLOBIN: 11.4 G/DL (ref 12–16)
LYMPHOCYTES ABSOLUTE: 1.4 K/UL (ref 1–4.8)
LYMPHOCYTES RELATIVE PERCENT: 12.6 %
MCH RBC QN AUTO: 31.5 PG (ref 27–31.3)
MCHC RBC AUTO-ENTMCNC: 34 % (ref 33–37)
MCV RBC AUTO: 92.6 FL (ref 82–100)
MONOCYTES ABSOLUTE: 0.4 K/UL (ref 0.2–0.8)
MONOCYTES RELATIVE PERCENT: 3.8 %
NEUTROPHILS ABSOLUTE: 9.3 K/UL (ref 1.4–6.5)
NEUTROPHILS RELATIVE PERCENT: 82.1 %
PDW BLD-RTO: 18.1 % (ref 11.5–14.5)
PLATELET # BLD: 105 K/UL (ref 130–400)
RBC # BLD: 3.61 M/UL (ref 4.2–5.4)
URINE CULTURE, ROUTINE: NORMAL
WBC # BLD: 11.3 K/UL (ref 4.8–10.8)

## 2018-10-23 PROCEDURE — 6370000000 HC RX 637 (ALT 250 FOR IP): Performed by: PHYSICIAN ASSISTANT

## 2018-10-23 PROCEDURE — 6360000002 HC RX W HCPCS: Performed by: INTERNAL MEDICINE

## 2018-10-23 PROCEDURE — 6370000000 HC RX 637 (ALT 250 FOR IP): Performed by: INTERNAL MEDICINE

## 2018-10-23 PROCEDURE — 6360000002 HC RX W HCPCS: Performed by: PHYSICIAN ASSISTANT

## 2018-10-23 PROCEDURE — 36591 DRAW BLOOD OFF VENOUS DEVICE: CPT

## 2018-10-23 PROCEDURE — 99233 SBSQ HOSP IP/OBS HIGH 50: CPT | Performed by: INTERNAL MEDICINE

## 2018-10-23 PROCEDURE — 85025 COMPLETE CBC W/AUTO DIFF WBC: CPT

## 2018-10-23 PROCEDURE — 99233 SBSQ HOSP IP/OBS HIGH 50: CPT | Performed by: RADIOLOGY

## 2018-10-23 PROCEDURE — 2580000003 HC RX 258: Performed by: PHYSICIAN ASSISTANT

## 2018-10-23 PROCEDURE — 1210000000 HC MED SURG R&B

## 2018-10-23 RX ORDER — MIDODRINE HYDROCHLORIDE 5 MG/1
5 TABLET ORAL
Status: DISCONTINUED | OUTPATIENT
Start: 2018-10-23 | End: 2018-10-24 | Stop reason: HOSPADM

## 2018-10-23 RX ORDER — MORPHINE SULFATE 2 MG/ML
1 INJECTION, SOLUTION INTRAMUSCULAR; INTRAVENOUS ONCE
Status: COMPLETED | OUTPATIENT
Start: 2018-10-23 | End: 2018-10-23

## 2018-10-23 RX ORDER — ONDANSETRON 2 MG/ML
4 INJECTION INTRAMUSCULAR; INTRAVENOUS EVERY 6 HOURS PRN
Status: DISCONTINUED | OUTPATIENT
Start: 2018-10-23 | End: 2018-10-24 | Stop reason: HOSPADM

## 2018-10-23 RX ORDER — TRAMADOL HYDROCHLORIDE 50 MG/1
50 TABLET ORAL ONCE
Status: COMPLETED | OUTPATIENT
Start: 2018-10-23 | End: 2018-10-23

## 2018-10-23 RX ADMIN — PIPERACILLIN SODIUM,TAZOBACTAM SODIUM 3.38 G: 3; .375 INJECTION, POWDER, FOR SOLUTION INTRAVENOUS at 17:40

## 2018-10-23 RX ADMIN — SODIUM BICARBONATE 650 MG: 650 TABLET ORAL at 08:20

## 2018-10-23 RX ADMIN — MIDODRINE HYDROCHLORIDE 5 MG: 5 TABLET ORAL at 12:08

## 2018-10-23 RX ADMIN — CLONAZEPAM 0.5 MG: 0.5 TABLET ORAL at 21:13

## 2018-10-23 RX ADMIN — MYCOPHENOLIC ACID 180 MG: 360 TABLET, DELAYED RELEASE ORAL at 08:21

## 2018-10-23 RX ADMIN — MORPHINE SULFATE 1 MG: 2 INJECTION, SOLUTION INTRAMUSCULAR; INTRAVENOUS at 11:04

## 2018-10-23 RX ADMIN — HALOPERIDOL 5 MG: 2 TABLET ORAL at 12:56

## 2018-10-23 RX ADMIN — ONDANSETRON 4 MG: 2 INJECTION INTRAMUSCULAR; INTRAVENOUS at 11:04

## 2018-10-23 RX ADMIN — ACETAMINOPHEN 650 MG: 325 TABLET ORAL at 17:50

## 2018-10-23 RX ADMIN — SEVELAMER CARBONATE 400 MG: 800 TABLET, FILM COATED ORAL at 21:23

## 2018-10-23 RX ADMIN — SEVELAMER CARBONATE 1600 MG: 800 TABLET, FILM COATED ORAL at 08:20

## 2018-10-23 RX ADMIN — ACETAMINOPHEN 650 MG: 325 TABLET ORAL at 04:14

## 2018-10-23 RX ADMIN — Medication 10 ML: at 21:00

## 2018-10-23 RX ADMIN — PIPERACILLIN SODIUM,TAZOBACTAM SODIUM 3.38 G: 3; .375 INJECTION, POWDER, FOR SOLUTION INTRAVENOUS at 05:55

## 2018-10-23 RX ADMIN — CLONAZEPAM 0.5 MG: 0.5 TABLET ORAL at 08:20

## 2018-10-23 RX ADMIN — VENLAFAXINE HYDROCHLORIDE 37.5 MG: 37.5 CAPSULE, EXTENDED RELEASE ORAL at 08:20

## 2018-10-23 RX ADMIN — SEVELAMER CARBONATE 1600 MG: 800 TABLET, FILM COATED ORAL at 12:06

## 2018-10-23 RX ADMIN — TRAZODONE HYDROCHLORIDE 50 MG: 50 TABLET ORAL at 21:13

## 2018-10-23 RX ADMIN — SODIUM BICARBONATE 650 MG: 650 TABLET ORAL at 21:13

## 2018-10-23 RX ADMIN — SODIUM BICARBONATE 650 MG: 650 TABLET ORAL at 12:56

## 2018-10-23 RX ADMIN — SEVELAMER CARBONATE 1600 MG: 800 TABLET, FILM COATED ORAL at 17:43

## 2018-10-23 RX ADMIN — HYDROXYZINE PAMOATE 50 MG: 50 CAPSULE ORAL at 12:56

## 2018-10-23 RX ADMIN — Medication 1 MG: at 08:20

## 2018-10-23 RX ADMIN — TRAMADOL HYDROCHLORIDE 50 MG: 50 TABLET, FILM COATED ORAL at 07:01

## 2018-10-23 RX ADMIN — MIDODRINE HYDROCHLORIDE 5 MG: 5 TABLET ORAL at 17:43

## 2018-10-23 RX ADMIN — SIROLIMUS 1 MG: 1 TABLET, FILM COATED ORAL at 08:22

## 2018-10-23 ASSESSMENT — ENCOUNTER SYMPTOMS
DIARRHEA: 1
COUGH: 0
NAUSEA: 0
VOMITING: 0
SHORTNESS OF BREATH: 0

## 2018-10-23 ASSESSMENT — PAIN SCALES - GENERAL
PAINLEVEL_OUTOF10: 8
PAINLEVEL_OUTOF10: 2
PAINLEVEL_OUTOF10: 4
PAINLEVEL_OUTOF10: 7

## 2018-10-23 NOTE — PROGRESS NOTES
Vascular and Interventional Radiology   Libia Ortiz. Carlos Chase      Subjective: Rafaela Loza is a 42 y.o. female patient status post left AV graft extensive thrombectomy and angioplasty 2 days ago. She has developed cellulitis with abscess and bacteremia which was cultured as MRSA. Purulent pus has been discharging from a small opening in the skin in the medial arm. She has felt better since the discharge with decrease in arm sensitivity and erythema. She remains on IV antibiotics. Graft remains patent with good thrill and blood flow audible and palpable. Objective:   BP 91/69   Pulse 100   Temp 98.8 °F (37.1 °C) (Oral)   Resp 16   Ht 5' 1\" (1.549 m)   Wt 128 lb 12 oz (58.4 kg)   SpO2 98%   BMI 24.33 kg/m²     Physical:  thin,  alert and  not in acute distress    Normocephalic, without obvious abnormality, atraumatic    Neck supple. No adenopathy. Thyroid symmetric, normal size, and without nodularity    normal rate, regular rhythm, intact distal pulses and no carotid bruits    chest clear, no wheezing, rales, normal symmetric air entry    positive findings: Left arm erythema medially is slightly improved. There is now a draining skin opening. There is a strong thrill and pulse palpable in left ACG. Lab:  Recent Labs      10/23/18   0600   WBC  11.3*   RBC  3.61*   HGB  11.4*   HCT  33.5*   MCV  92.6   MCH  31.5*   MCHC  34.0   RDW  18.1*   PLT  105*       No results for input(s): INR, PROTIME in the last 72 hours. Recent Labs      10/21/18   2239  10/22/18   0600   CREATININE  8.29*  9.13*       Assessment: Rafaela Loza is a 42 y.o. female post successful thrombectomy and angioplasty of thrombosed and stenosed left arm AVG. Now with MRSA abscess and bacteremia. Plan: Nothing to do for AVG as is patent. Continue ABX per ID. Will remove dialysis catheter and replace if ID feels necessary for infection. Libia Ortiz.  Carlos Chase  10/23/2018,  2:01 PM

## 2018-10-23 NOTE — PROGRESS NOTES
piperacillin-tazobactam (ZOSYN) 3.375 g in dextrose 5 % 50 mL IVPB extended infusion (mini-bag)  3.375 g Intravenous Q12H Michelle Alvares PA-C   Stopped at 10/23/18 1009    sevelamer (RENVELA) tablet 1,600 mg  1,600 mg Oral TID WC Michelle Alvares PA-C   1,600 mg at 10/23/18 1206    sevelamer (RENVELA) tablet 400 mg  400 mg Oral With Snacks Michelle Alvares PA-C        sodium bicarbonate tablet 650 mg  650 mg Oral TID Michelle Alvares PA-C   650 mg at 10/23/18 1256    venlafaxine (EFFEXOR XR) extended release capsule 37.5 mg  37.5 mg Oral Daily with breakfast Michelle Alvares PA-C   37.5 mg at 10/23/18 0820    vancomycin (VANCOCIN) intermittent dosing (placeholder)   Other RX Placeholder Tiffany Tarri Litten, PA-C        vancomycin (VANCOCIN) 500 mg in dextrose 5% 100 mL IVPB  500 mg Intravenous Q MWF Michelle Alvares PA-C   Stopped at 10/22/18 2309    heparin (porcine) injection 5,000 Units  5,000 Units Subcutaneous BID Michelle Alvares PA-C   5,000 Units at 10/22/18 2306    sirolimus (RAPAMUNE) tablet 1 mg  1 mg Oral Daily Axel Baird DO   1 mg at 10/23/18 8213    Mycophenolate Sodium TBEC 180 mg  180 mg Oral Daily Michelle Alvares PA-C   180 mg at 10/23/18 7936     Allergies   Allergen Reactions    Dye [Iodides] Other (See Comments)           Percocet [Oxycodone-Acetaminophen] Other (See Comments)     Pt states she is no longer allergic      Active Problems:    AV graft malfunction, initial encounter ([facility] [address]    AV fistula infection, initial encounter ([facility] [address]    Abscess of left arm  Resolved Problems:    * No resolved hospital problems. *    Blood pressure 91/69, pulse 100, temperature 98.8 °F (37.1 °C), temperature source Oral, resp. rate 16, height 5' 1\" (1.549 m), weight 128 lb 12 oz (58.4 kg), SpO2 98 %, not currently breastfeeding. Subjective:  Symptoms:  She reports malaise, weakness and diarrhea. No shortness of breath, cough, chest pain, headache, chest pressure or anxiety.     Diet:  No nausea or vomiting. Objective:  General Appearance:  Well-appearing, in no acute distress and comfortable. Vital signs: (most recent): Blood pressure 91/69, pulse 100, temperature 98.8 °F (37.1 °C), temperature source Oral, resp. rate 16, height 5' 1\" (1.549 m), weight 128 lb 12 oz (58.4 kg), SpO2 98 %, not currently breastfeeding. HEENT: Normal HEENT exam.    Lungs:  Normal effort and normal respiratory rate. Breath sounds clear to auscultation. Heart: Normal rate. Regular rhythm. S1 normal and S2 normal.    Abdomen: Abdomen is soft. Bowel sounds are normal.     Neurological: Patient is alert and oriented to person, place and time. Pupils:  Pupils are equal, round, and reactive to light. Skin:  Warm and dry.       Lab Results   Component Value Date    WBC 11.3 (H) 10/23/2018    HGB 11.4 (L) 10/23/2018    HCT 33.5 (L) 10/23/2018    MCV 92.6 10/23/2018     (L) 10/23/2018     Lab Results   Component Value Date     10/22/2018    K 4.9 10/22/2018    CL 94 10/22/2018    CO2 19 10/22/2018    BUN 65 10/22/2018    CREATININE 9.13 10/22/2018    GLUCOSE 101 10/22/2018    CALCIUM 8.1 10/22/2018        Assessment & Plan  1) AV fistular infection   2) post AV fistula infection  3) sepsis  4 bacteremia  5)E SRD  C/w mainatance HD  6) diarrhea  7) left arm abscess  8) renal transplant pt  C/w mycophenlate and Sirolimus  C/w IV abx  FU blood cx sensitivity  Possible needing GEM  Report permacath if ID waranted  9) severe depression and suicidal idealation  C/w one one   FU psych  Jong Romo MD  10/23/2018

## 2018-10-24 VITALS
RESPIRATION RATE: 16 BRPM | HEIGHT: 61 IN | BODY MASS INDEX: 23.64 KG/M2 | DIASTOLIC BLOOD PRESSURE: 87 MMHG | OXYGEN SATURATION: 98 % | WEIGHT: 125.22 LBS | HEART RATE: 112 BPM | SYSTOLIC BLOOD PRESSURE: 115 MMHG | TEMPERATURE: 98.6 F

## 2018-10-24 LAB
ANION GAP SERPL CALCULATED.3IONS-SCNC: 20 MEQ/L (ref 7–13)
BLOOD CULTURE, ROUTINE: ABNORMAL
BLOOD CULTURE, ROUTINE: ABNORMAL
BUN BLDV-MCNC: 43 MG/DL (ref 6–20)
CALCIUM SERPL-MCNC: 7.9 MG/DL (ref 8.6–10.2)
CHLORIDE BLD-SCNC: 93 MEQ/L (ref 98–107)
CO2: 24 MEQ/L (ref 22–29)
CREAT SERPL-MCNC: 8.9 MG/DL (ref 0.5–0.9)
CULTURE, BLOOD 2: ABNORMAL
GFR AFRICAN AMERICAN: 6
GFR NON-AFRICAN AMERICAN: 4.9
GLUCOSE BLD-MCNC: 86 MG/DL (ref 74–109)
ORGANISM: ABNORMAL
POTASSIUM SERPL-SCNC: 3.3 MEQ/L (ref 3.5–5.1)
SODIUM BLD-SCNC: 137 MEQ/L (ref 132–144)
VANCOMYCIN TROUGH: 14.6 UG/ML (ref 10–20)

## 2018-10-24 PROCEDURE — 6370000000 HC RX 637 (ALT 250 FOR IP): Performed by: PHYSICIAN ASSISTANT

## 2018-10-24 PROCEDURE — 80048 BASIC METABOLIC PNL TOTAL CA: CPT

## 2018-10-24 PROCEDURE — 6360000002 HC RX W HCPCS: Performed by: INTERNAL MEDICINE

## 2018-10-24 PROCEDURE — 36591 DRAW BLOOD OFF VENOUS DEVICE: CPT

## 2018-10-24 PROCEDURE — 6370000000 HC RX 637 (ALT 250 FOR IP): Performed by: INTERNAL MEDICINE

## 2018-10-24 PROCEDURE — 36592 COLLECT BLOOD FROM PICC: CPT

## 2018-10-24 PROCEDURE — 6360000002 HC RX W HCPCS: Performed by: PHYSICIAN ASSISTANT

## 2018-10-24 PROCEDURE — 80202 ASSAY OF VANCOMYCIN: CPT

## 2018-10-24 RX ORDER — HEPARIN SODIUM 1000 [USP'U]/ML
4000 INJECTION, SOLUTION INTRAVENOUS; SUBCUTANEOUS ONCE
Status: COMPLETED | OUTPATIENT
Start: 2018-10-24 | End: 2018-10-24

## 2018-10-24 RX ORDER — HEPARIN SODIUM 1000 [USP'U]/ML
2000 INJECTION, SOLUTION INTRAVENOUS; SUBCUTANEOUS ONCE
Status: COMPLETED | OUTPATIENT
Start: 2018-10-24 | End: 2018-10-24

## 2018-10-24 RX ADMIN — HEPARIN SODIUM 2000 UNITS: 1000 INJECTION, SOLUTION INTRAVENOUS; SUBCUTANEOUS at 16:05

## 2018-10-24 RX ADMIN — SIROLIMUS 1 MG: 1 TABLET, FILM COATED ORAL at 12:11

## 2018-10-24 RX ADMIN — CLONAZEPAM 0.5 MG: 0.5 TABLET ORAL at 20:33

## 2018-10-24 RX ADMIN — SODIUM BICARBONATE 650 MG: 650 TABLET ORAL at 15:24

## 2018-10-24 RX ADMIN — TRAZODONE HYDROCHLORIDE 50 MG: 50 TABLET ORAL at 20:33

## 2018-10-24 RX ADMIN — SEVELAMER CARBONATE 1600 MG: 800 TABLET, FILM COATED ORAL at 12:06

## 2018-10-24 RX ADMIN — VANCOMYCIN HYDROCHLORIDE 500 MG: 500 INJECTION, SOLUTION INTRAVENOUS at 18:17

## 2018-10-24 RX ADMIN — MIDODRINE HYDROCHLORIDE 5 MG: 5 TABLET ORAL at 12:05

## 2018-10-24 RX ADMIN — CLONAZEPAM 0.5 MG: 0.5 TABLET ORAL at 12:05

## 2018-10-24 RX ADMIN — HEPARIN SODIUM 4000 UNITS: 1000 INJECTION, SOLUTION INTRAVENOUS; SUBCUTANEOUS at 19:10

## 2018-10-24 RX ADMIN — SODIUM BICARBONATE 650 MG: 650 TABLET ORAL at 20:33

## 2018-10-24 RX ADMIN — HYDROXYZINE PAMOATE 50 MG: 50 CAPSULE ORAL at 15:27

## 2018-10-24 RX ADMIN — Medication 1 MG: at 12:05

## 2018-10-24 RX ADMIN — SODIUM BICARBONATE 650 MG: 650 TABLET ORAL at 12:17

## 2018-10-24 ASSESSMENT — ENCOUNTER SYMPTOMS: SHORTNESS OF BREATH: 0

## 2018-10-24 ASSESSMENT — PAIN SCALES - GENERAL
PAINLEVEL_OUTOF10: 0

## 2018-10-24 NOTE — DISCHARGE INSTR - COC
Encounter Level? Added Added By Resolved Resolved By Review Date Onset Date    MRSA No 10/23/18 Florentin Gonsalez RN    10/21/18    MRSA blood on 10/21/2018. Electronically signed by Florentin Gonsalez RN on 10/23/2018 at 7:21 AM          Nurse Assessment:  Last Vital Signs: /87   Pulse 112   Temp 98.6 °F (37 °C)   Resp 16   Ht 5' 1\" (1.549 m)   Wt 125 lb 3.5 oz (56.8 kg)   SpO2 98%   BMI 23.66 kg/m²     Last documented pain score (0-10 scale): Pain Level: 0  Last Weight:   Wt Readings from Last 1 Encounters:   10/24/18 125 lb 3.5 oz (56.8 kg)     Mental Status:  {IP PT MENTAL STATUS:20030}    IV Access:  { NOEL IV ACCESS:572736387}    Nursing Mobility/ADLs:  Walking   {[facility] DME GHYZ:284166275}  Transfer  {[facility] DME XDJE:570035927}  Bathing  {[facility] DME ROIL:837029891}  Dressing  {[facility] DME WFLZ:899430431}  Toileting  {[facility] DME JRMZ:663420249}  Feeding  {[facility] DME GEXW:560090633}  Med Admin  {[facility] DME JGNT:797560349}  Med Delivery   { NOEL MED Delivery:694025418}    Wound Care Documentation and Therapy:        Elimination:  Continence:   · Bowel: {YES / HS:72224}  · Bladder: {YES / AQ:62796}  Urinary Catheter: {Urinary Catheter:703661751}   Colostomy/Ileostomy/Ileal Conduit: {YES / ANDREY:33194}       Date of Last BM: ***    Intake/Output Summary (Last 24 hours) at 10/24/18 1950  Last data filed at 10/24/18 1913   Gross per 24 hour   Intake             1600 ml   Output             1301 ml   Net              299 ml     I/O last 3 completed shifts:   In: 1200 [P.O.:1200]  Out: 1 [Urine:1]    Safety Concerns:     [employer] Safety Concerns:877414352}    Impairments/Disabilities:      [employer] Impairments/Disabilities:065717089}    Nutrition Therapy:  Current Nutrition Therapy:   [employer] Diet List:089641289}    Routes of Feeding: {CHP DME Other Feedings:098147517}  Liquids: {Slp liquid thickness:77249}  Daily Fluid Restriction: {CHP DME Yes amt example:396807223}  Last Modified Barium Swallow with Video (Video Swallowing Test):

## 2018-10-24 NOTE — PROGRESS NOTES
Received call from Vascular at [facility] ( [facility] [place] office) and they are recommending transfer to [facility] for graft infection. On call vascular to be the accepting physician. Discussed with primary. Phone numbers provided for transfer.      Jennifer Valles DO.

## 2018-10-27 LAB — BLOOD CULTURE, ROUTINE: NORMAL

## 2022-03-04 ENCOUNTER — TELEPHONE (OUTPATIENT)
Dept: INFECTIOUS DISEASES | Age: 42
End: 2022-03-04

## 2022-03-04 NOTE — TELEPHONE ENCOUNTER
Called patient FLORINA to return my call in the  Patient  office to Reschedule an Appointment. Patient SONNY 3/4/2022.

## 2022-08-14 ENCOUNTER — HOSPITAL ENCOUNTER (EMERGENCY)
Age: 42
Discharge: HOME | End: 2022-08-14
Attending: EMERGENCY MEDICINE
Payer: COMMERCIAL

## 2022-08-14 VITALS
TEMPERATURE: 97.5 F | SYSTOLIC BLOOD PRESSURE: 107 MMHG | HEART RATE: 89 BPM | WEIGHT: 131.39 LBS | RESPIRATION RATE: 16 BRPM | BODY MASS INDEX: 24.18 KG/M2 | OXYGEN SATURATION: 98 % | DIASTOLIC BLOOD PRESSURE: 77 MMHG | HEIGHT: 62 IN

## 2022-08-14 DIAGNOSIS — K08.89 PAIN, DENTAL: Primary | ICD-10-CM

## 2022-08-14 DIAGNOSIS — K02.9 DENTAL CARIES: ICD-10-CM

## 2022-08-14 PROCEDURE — 6370000000 HC RX 637 (ALT 250 FOR IP): Performed by: EMERGENCY MEDICINE

## 2022-08-14 PROCEDURE — 99283 EMERGENCY DEPT VISIT LOW MDM: CPT

## 2022-08-14 RX ORDER — LIDOCAINE HYDROCHLORIDE 20 MG/ML
15 SOLUTION OROPHARYNGEAL PRN
Qty: 100 ML | Refills: 0 | Status: SHIPPED | OUTPATIENT
Start: 2022-08-14

## 2022-08-14 RX ORDER — CHLORHEXIDINE GLUCONATE 0.12 MG/ML
15 RINSE ORAL 2 TIMES DAILY
Qty: 420 ML | Refills: 0 | Status: SHIPPED | OUTPATIENT
Start: 2022-08-14 | End: 2022-08-28

## 2022-08-14 RX ORDER — OXYCODONE HYDROCHLORIDE AND ACETAMINOPHEN 5; 325 MG/1; MG/1
1 TABLET ORAL ONCE
Status: COMPLETED | OUTPATIENT
Start: 2022-08-14 | End: 2022-08-14

## 2022-08-14 RX ORDER — LIDOCAINE HYDROCHLORIDE 20 MG/ML
15 SOLUTION OROPHARYNGEAL ONCE
Status: COMPLETED | OUTPATIENT
Start: 2022-08-14 | End: 2022-08-14

## 2022-08-14 RX ORDER — OXYCODONE HYDROCHLORIDE AND ACETAMINOPHEN 5; 325 MG/1; MG/1
1 TABLET ORAL EVERY 6 HOURS PRN
Qty: 10 TABLET | Refills: 0 | Status: SHIPPED | OUTPATIENT
Start: 2022-08-14 | End: 2022-08-17

## 2022-08-14 RX ADMIN — OXYCODONE AND ACETAMINOPHEN 1 TABLET: 325; 5 TABLET ORAL at 15:45

## 2022-08-14 RX ADMIN — LIDOCAINE HYDROCHLORIDE 15 ML: 20 SOLUTION ORAL; TOPICAL at 15:45

## 2022-08-14 ASSESSMENT — ENCOUNTER SYMPTOMS
VOMITING: 0
COLOR CHANGE: 0
RHINORRHEA: 0
APNEA: 0
COUGH: 0
DIARRHEA: 0
SINUS PRESSURE: 0
BACK PAIN: 0
PHOTOPHOBIA: 0
ABDOMINAL PAIN: 0
ABDOMINAL DISTENTION: 0
EYE PAIN: 0
NAUSEA: 0
SORE THROAT: 0
SHORTNESS OF BREATH: 0
CONSTIPATION: 0
WHEEZING: 0

## 2022-08-14 ASSESSMENT — PAIN DESCRIPTION - DESCRIPTORS: DESCRIPTORS: THROBBING

## 2022-08-14 ASSESSMENT — PAIN DESCRIPTION - LOCATION: LOCATION: MOUTH

## 2022-08-14 ASSESSMENT — PAIN SCALES - GENERAL: PAINLEVEL_OUTOF10: 10

## 2022-08-14 ASSESSMENT — PAIN DESCRIPTION - PAIN TYPE: TYPE: ACUTE PAIN

## 2022-08-14 ASSESSMENT — PAIN DESCRIPTION - FREQUENCY: FREQUENCY: CONTINUOUS

## 2022-08-14 ASSESSMENT — PAIN - FUNCTIONAL ASSESSMENT: PAIN_FUNCTIONAL_ASSESSMENT: 0-10

## 2022-08-14 NOTE — ED PROVIDER NOTES
[address] [place] [address] ED  eMERGENCY dEPARTMENT eNCOUnter      Pt Name: Gustabo Silverio  MRN: [id number]  [place] 1980  Date of evaluation: 8/14/2022  Provider: Anup Christianson MD    CHIEF COMPLAINT       Chief Complaint   Patient presents with    Dental Pain     Pt has increased dental pain and wasn't able to get her dental appointment sooner. HISTORY OF PRESENT ILLNESS   (Location/Symptom, Timing/Onset,Context/Setting, Quality, Duration, Modifying Factors, Severity)  Note limiting factors. Gustabo Silverio is a 35 y.o. female who presents to the emergency department with complaint of dental pain. Patient made an appointment with her dentist coming Wednesday for extraction. Pain is 8 in a scale of 1-10 and worse with cold and hot drinks. Does not radiate. It is sharp. Tylenol and ibuprofen are not helping. HPI    Nursing Notes were reviewed. REVIEW OF SYSTEMS    (2-9 systems for level 4, 10 or more for level 5)     Review of Systems   Constitutional: Negative. Negative for activity change, appetite change, chills, fatigue and fever. HENT:  Positive for dental problem. Negative for congestion, ear discharge, ear pain, hearing loss, rhinorrhea, sinus pressure and sore throat. Eyes:  Negative for photophobia, pain and visual disturbance. Respiratory:  Negative for apnea, cough, shortness of breath and wheezing. Cardiovascular:  Negative for chest pain, palpitations and leg swelling. Gastrointestinal:  Negative for abdominal distention, abdominal pain, constipation, diarrhea, nausea and vomiting. Endocrine: Negative for cold intolerance, heat intolerance and polyuria. Genitourinary:  Negative for dysuria, flank pain, frequency and urgency. Musculoskeletal:  Negative for arthralgias, back pain, gait problem, myalgias and neck stiffness. Skin:  Negative for color change, pallor and rash. Allergic/Immunologic: Negative for food allergies and immunocompromised state. Neurological:  Negative for dizziness, tremors, syncope, weakness, light-headedness and headaches. Psychiatric/Behavioral:  Negative for agitation, confusion and hallucinations. All other systems reviewed and are negative. Except as noted above the remainder of the review of systems was reviewed and negative. PAST MEDICAL HISTORY     Past Medical History:   Diagnosis Date    Chronic kidney disease (CKD)     Gouty arthritis     History of renal transplant     Pyelonephritis     Recurrent pyelonephritis     Recurrent urinary tract infection          SURGICAL HISTORY       Past Surgical History:   Procedure Laterality Date     SECTION      HYSTERECTOMY Bilateral     KIDNEY TRANSPLANT  2005    TONSILLECTOMY      TUBAL LIGATION Bilateral          CURRENT MEDICATIONS       Previous Medications    GABAPENTIN (NEURONTIN) 100 MG CAPSULE    Take 100 mg by mouth nightly. Lawerterence Roughen     MYCOPHENOLATE (MYFORTIC) 180 MG DR TABLET    Take 180 mg by mouth daily    POTASSIUM CHLORIDE (MICRO-K) 10 MEQ EXTENDED RELEASE CAPSULE    Take 20 mEq by mouth three times a week    SEVELAMER (RENVELA) 800 MG TABLET    Take 4 tablets by mouth 3 times daily (with meals)    SEVELAMER (RENVELA) 800 MG TABLET    Take 200 mg by mouth take with snacks    SIROLIMUS (RAPAMUNE) 1 MG TABLET    Take 2 mg by mouth daily     SODIUM BICARBONATE 650 MG TABLET    Take 650 mg by mouth 4 times daily       ALLERGIES     Dye [iodides] and Percocet [oxycodone-acetaminophen]    FAMILY HISTORY       Family History   Problem Relation Age of Onset    Heart Disease Mother     Diabetes Mother     Other Mother         LIVER DISEASE, INTERNAL BLEEDING    High Blood Pressure Father     Other Father         COPD, AGENT ORANGE    Other Brother         OBESITY          SOCIAL HISTORY       Social History     Socioeconomic History    Marital status: Single   Tobacco Use    Smoking status: Every Day     Packs/day: 0.25     Years: 15.00     Pack years: 3.75     Types: Cigarettes    Smokeless tobacco: Never   Substance and Sexual Activity    Alcohol use: No    Drug use: No    Sexual activity: Yes     Partners: Male       SCREENINGS             PHYSICAL EXAM    (up to 7 for level 4, 8 or more for level 5)     ED Triage Vitals   BP Temp Temp src Pulse Resp SpO2 Height Weight   -- -- -- -- -- -- -- --       Physical Exam  Vitals and nursing note reviewed. Constitutional:       General: She is not in acute distress. Appearance: Normal appearance. She is well-developed and normal weight. She is not ill-appearing, toxic-appearing or diaphoretic. HENT:      Head: Normocephalic and atraumatic. Nose: Nose normal. No congestion or rhinorrhea. Mouth/Throat:      Mouth: Mucous membranes are moist.      Pharynx: Oropharynx is clear. No oropharyngeal exudate or posterior oropharyngeal erythema. Comments: Diffuse dental caries, cavities and decay  Eyes:      General: No scleral icterus. Right eye: No discharge. Left eye: No discharge. Extraocular Movements: Extraocular movements intact. Conjunctiva/sclera: Conjunctivae normal.      Pupils: Pupils are equal, round, and reactive to light. Neck:      Thyroid: No thyromegaly. Vascular: No carotid bruit or JVD. Trachea: No tracheal deviation. Cardiovascular:      Rate and Rhythm: Normal rate and regular rhythm. Pulses: Normal pulses. Heart sounds: Normal heart sounds. No murmur heard. No friction rub. No gallop. Pulmonary:      Effort: Pulmonary effort is normal. No respiratory distress. Breath sounds: Normal breath sounds. No stridor. No wheezing, rhonchi or rales. Chest:      Chest wall: No tenderness. Abdominal:      General: Abdomen is flat. Bowel sounds are normal. There is no distension. Palpations: Abdomen is soft. There is no mass.       Tenderness: no abdominal tenderness There is no right CVA tenderness, left CVA tenderness, guarding or rebound. Hernia: No hernia is present. Musculoskeletal:         General: No swelling, tenderness, deformity or signs of injury. Normal range of motion. Cervical back: Normal range of motion and neck supple. No rigidity or tenderness. Right lower leg: No edema. Left lower leg: No edema. Lymphadenopathy:      Cervical: No cervical adenopathy. Skin:     General: Skin is warm and dry. Capillary Refill: Capillary refill takes less than 2 seconds. Coloration: Skin is not jaundiced or pale. Findings: No bruising, erythema, lesion or rash. Neurological:      General: No focal deficit present. Mental Status: She is alert and oriented to person, place, and time. Mental status is at baseline. Cranial Nerves: No cranial nerve deficit. Sensory: No sensory deficit. Motor: No weakness or abnormal muscle tone. Coordination: Coordination normal.      Gait: Gait normal.      Deep Tendon Reflexes: Reflexes are normal and symmetric. Reflexes normal.   Psychiatric:         Mood and Affect: Mood normal.         Behavior: Behavior normal.         Thought Content: Thought content normal.         Judgment: Judgment normal.       DIAGNOSTIC RESULTS     EKG: All EKG's are interpreted by the Emergency Department Physician who either signs or Co-signs this chart in the absence of a cardiologist.        RADIOLOGY:   Non-plain film images such as CT, Ultrasound and MRI are read by the radiologist. Ryan Das radiographicimages are visualized and preliminarily interpreted by the emergency physician with the below findings:        Interpretation per the Radiologist below, if available at the time of this note:    No orders to display         ED BEDSIDE ULTRASOUND:   Performed by ED Physician - none    LABS:  Labs Reviewed - No data to display    All other labs were within normal range or not returned as of this dictation.     EMERGENCY DEPARTMENT COURSE and DIFFERENTIALDIAGNOSIS/MDM:   Vitals: There were no vitals filed for this visit. MDM  Number of Diagnoses or Management Options  Risk of Complications, Morbidity, and/or Mortality  Presenting problems: low  Diagnostic procedures: low  Management options: low    Patient Progress  Patient progress: improved      CRITICAL CARE TIME   Total Critical Care time was  minutes, excluding separately reportable procedures. There was a high probability of clinically significant/life threatening deterioration in the patient's condition which required my urgentintervention. CONSULTS:  None    PROCEDURES:  Unless otherwise noted below, none     Procedures    FINAL IMPRESSION      1. Pain, dental    2. Dental caries          DISPOSITION/PLAN   DISPOSITION Decision To Discharge 08/14/2022 03:32:54 PM      PATIENT REFERRED TO:  Follow-up with your dentist as already scheduled on Wednesday            DISCHARGE MEDICATIONS:  New Prescriptions    CHLORHEXIDINE (PERIDEX) 0.12 % SOLUTION    Take 15 mLs by mouth in the morning and 15 mLs before bedtime. Do all this for 14 days. LIDOCAINE VISCOUS HCL (XYLOCAINE) 2 % SOLN SOLUTION    Take 15 mLs by mouth as needed for Irritation    OXYCODONE-ACETAMINOPHEN (PERCOCET) 5-325 MG PER TABLET    Take 1 tablet by mouth every 6 hours as needed for Pain for up to 3 days. WARNING:  May cause drowsiness. May impair ability to operate vehicles or machinery. Do not use in combination with alcohol.           (Please note that portions of this note were completed with a voice recognitionprogram.  Efforts were made to edit the dictations but occasionally words are mis-transcribed.)    Irma Bence, MD (electronically signed)  Attending Emergency Physician            Irma Bence, MD  08/14/22 [phone]

## 2022-12-12 ENCOUNTER — HOSPITAL ENCOUNTER (EMERGENCY)
Age: 42
Discharge: HOME | End: 2022-12-12
Payer: COMMERCIAL

## 2022-12-12 ENCOUNTER — HOSPITAL ENCOUNTER (EMERGENCY)
Dept: ULTRASOUND IMAGING | Age: 42
Discharge: HOME | End: 2022-12-14
Payer: COMMERCIAL

## 2022-12-12 VITALS
RESPIRATION RATE: 18 BRPM | DIASTOLIC BLOOD PRESSURE: 78 MMHG | HEIGHT: 62 IN | WEIGHT: 123 LBS | OXYGEN SATURATION: 98 % | SYSTOLIC BLOOD PRESSURE: 132 MMHG | TEMPERATURE: 96.8 F | HEART RATE: 78 BPM | BODY MASS INDEX: 22.63 KG/M2

## 2022-12-12 DIAGNOSIS — M62.838 TRAPEZIUS MUSCLE SPASM: ICD-10-CM

## 2022-12-12 DIAGNOSIS — S16.1XXA STRAIN OF NECK MUSCLE, INITIAL ENCOUNTER: Primary | ICD-10-CM

## 2022-12-12 PROCEDURE — 99283 EMERGENCY DEPT VISIT LOW MDM: CPT

## 2022-12-12 PROCEDURE — 6370000000 HC RX 637 (ALT 250 FOR IP)

## 2022-12-12 RX ORDER — CYCLOBENZAPRINE HCL 10 MG
10 TABLET ORAL 2 TIMES DAILY PRN
Qty: 10 TABLET | Refills: 0 | Status: SHIPPED | OUTPATIENT
Start: 2022-12-12 | End: 2022-12-17

## 2022-12-12 RX ORDER — HYDROCODONE BITARTRATE AND ACETAMINOPHEN 5; 325 MG/1; MG/1
1 TABLET ORAL EVERY 8 HOURS PRN
Qty: 8 TABLET | Refills: 0 | Status: SHIPPED | OUTPATIENT
Start: 2022-12-12 | End: 2022-12-15

## 2022-12-12 RX ORDER — CYCLOBENZAPRINE HCL 10 MG
10 TABLET ORAL ONCE
Status: COMPLETED | OUTPATIENT
Start: 2022-12-12 | End: 2022-12-12

## 2022-12-12 RX ORDER — HYDROCODONE BITARTRATE AND ACETAMINOPHEN 5; 325 MG/1; MG/1
1 TABLET ORAL ONCE
Status: COMPLETED | OUTPATIENT
Start: 2022-12-12 | End: 2022-12-12

## 2022-12-12 RX ADMIN — CYCLOBENZAPRINE 10 MG: 10 TABLET, FILM COATED ORAL at 15:31

## 2022-12-12 RX ADMIN — HYDROCODONE BITARTRATE AND ACETAMINOPHEN 1 TABLET: 5; 325 TABLET ORAL at 15:30

## 2022-12-12 ASSESSMENT — ENCOUNTER SYMPTOMS
VOMITING: 0
DIARRHEA: 0
SHORTNESS OF BREATH: 0
BACK PAIN: 0
SORE THROAT: 0
ABDOMINAL PAIN: 0
NAUSEA: 0
COUGH: 0

## 2022-12-12 ASSESSMENT — PAIN DESCRIPTION - LOCATION
LOCATION: BACK;NECK
LOCATION: NECK;SHOULDER

## 2022-12-12 ASSESSMENT — PAIN SCALES - GENERAL
PAINLEVEL_OUTOF10: 7
PAINLEVEL_OUTOF10: 7

## 2022-12-12 ASSESSMENT — PAIN DESCRIPTION - FREQUENCY: FREQUENCY: CONTINUOUS

## 2022-12-12 ASSESSMENT — PAIN DESCRIPTION - DESCRIPTORS
DESCRIPTORS: SHARP;PRESSURE
DESCRIPTORS: ACHING

## 2022-12-12 ASSESSMENT — PAIN DESCRIPTION - ORIENTATION: ORIENTATION: LEFT

## 2022-12-12 ASSESSMENT — PAIN - FUNCTIONAL ASSESSMENT: PAIN_FUNCTIONAL_ASSESSMENT: 0-10

## 2022-12-12 NOTE — ED PROVIDER NOTES
[facility] ED  eMERGENCYdEPARTMENT eNCOUnter      Pt Name: Arelis Linda  MRN: [id number]  Марияgfolga 1980of evaluation: 12/12/2022  Provider:Kimberly Church       Chief Complaint   Patient presents with    Neck Pain     Upper back and neck pain. HISTORY OF PRESENT ILLNESS  (Location/Symptom, Timing/Onset, Context/Setting, Quality, Duration, Modifying Factors, Severity.)   Arelis Linda is a 35 y.o. female  hx of arthritis,  depression,  CKD, who presents to the emergency department with neck pain and left leg pain. Patient has been packing r/t moving lately and states she pulled a muscle in her left neck/upper back unrelieved by Motrin. She also noticed a bruise to her left upper outer thigh with a hard palpable area 2 days ago and is concerned for DVT as she did not suffer any injury for hematoma. Denies any CP, SOB, fever, chills, nausea, emesis, abdominal pain. HPI    Nursing Notes were reviewed and I agree. REVIEW OF SYSTEMS    (2-9 systems for level 4, 10 or more for level 5)     Review of Systems   Constitutional:  Negative for activity change, chills and fever. HENT:  Negative for ear pain and sore throat. Eyes:  Negative for visual disturbance. Respiratory:  Negative for cough and shortness of breath. Cardiovascular:  Negative for chest pain, palpitations and leg swelling. Gastrointestinal:  Negative for abdominal pain, diarrhea, nausea and vomiting. Genitourinary:  Negative for dysuria. Musculoskeletal:  Positive for myalgias (left upper back/neck, and left upper leg). Negative for back pain. Skin:  Negative for rash. Neurological:  Negative for dizziness and weakness. as noted above the remainder of the review of systems was reviewed and negative.        PAST MEDICAL HISTORY     Past Medical History:   Diagnosis Date    Chronic kidney disease (CKD)     Gouty arthritis     History of renal transplant     Pyelonephritis Recurrent pyelonephritis     Recurrent urinary tract infection          SURGICAL HISTORY       Past Surgical History:   Procedure Laterality Date     SECTION      HYSTERECTOMY (CERVIX STATUS UNKNOWN) Bilateral 2011    KIDNEY TRANSPLANT  2005    TONSILLECTOMY      TUBAL LIGATION Bilateral          CURRENT MEDICATIONS       Previous Medications    GABAPENTIN (NEURONTIN) 100 MG CAPSULE    Take 100 mg by mouth nightly. Shamika Posadas     LIDOCAINE VISCOUS HCL (XYLOCAINE) 2 % SOLN SOLUTION    Take 15 mLs by mouth as needed for Irritation    MYCOPHENOLATE (MYFORTIC) 180 MG DR TABLET    Take 180 mg by mouth daily    POTASSIUM CHLORIDE (MICRO-K) 10 MEQ EXTENDED RELEASE CAPSULE    Take 20 mEq by mouth three times a week    SEVELAMER (RENVELA) 800 MG TABLET    Take 4 tablets by mouth 3 times daily (with meals)    SEVELAMER (RENVELA) 800 MG TABLET    Take 200 mg by mouth take with snacks    SIROLIMUS (RAPAMUNE) 1 MG TABLET    Take 2 mg by mouth daily     SODIUM BICARBONATE 650 MG TABLET    Take 650 mg by mouth 4 times daily       ALLERGIES     Dye [iodides] and Percocet [oxycodone-acetaminophen]    HISTORY       Family History   Problem Relation Age of Onset    Heart Disease Mother     Diabetes Mother     Other Mother         LIVER DISEASE, INTERNAL BLEEDING    High Blood Pressure Father     Other Father         COPD, AGENT ORANGE    Other Brother         OBESITY          SOCIAL HISTORY       Social History     Socioeconomic History    Marital status: Single     Spouse name: None    Number of children: None    Years of education: None    Highest education level: None   Tobacco Use    Smoking status: Former     Packs/day: 0.50     Years: 15.00     Pack years: 7.50     Types: Cigarettes     Passive exposure: Current    Smokeless tobacco: Never   Vaping Use    Vaping Use: Every day    Substances: Nicotine   Substance and Sexual Activity    Alcohol use: No    Drug use: No    Sexual activity: Yes     Partners: Male       SCREENINGS Pravin Coma Scale  Eye Opening: Spontaneous  Best Verbal Response: Oriented  Best Motor Response: Obeys commands  Pravin Coma Scale Score: 15      PHYSICAL EXAM    (up to 7 forlevel 4, 8 or more for level 5)     ED Triage Vitals   BP Temp Temp Source Heart Rate Resp SpO2 Height Weight   12/12/22 1403 12/12/22 1403 12/12/22 1403 12/12/22 1411 12/12/22 1403 12/12/22 1403 12/12/22 1403 12/12/22 1403   (!) 141/81 96.8 °F (36 °C) Tympanic 88 16 98 % 5' 2\" (1.575 m) 123 lb (55.8 kg)       Physical Exam  Vitals and nursing note reviewed. Constitutional:       General: She is not in acute distress. Appearance: She is well-developed. She is not ill-appearing. HENT:      Head: Normocephalic and atraumatic. Right Ear: External ear normal.      Left Ear: External ear normal.      Nose: Nose normal.      Mouth/Throat:      Mouth: Mucous membranes are moist.   Eyes:      Conjunctiva/sclera: Conjunctivae normal.      Pupils: Pupils are equal, round, and reactive to light. Cardiovascular:      Rate and Rhythm: Normal rate and regular rhythm. Pulses: Normal pulses. Dorsalis pedis pulses are 2+ on the right side and 2+ on the left side. Heart sounds: Normal heart sounds. No murmur heard. No friction rub. Pulmonary:      Effort: Pulmonary effort is normal.      Breath sounds: Normal breath sounds. No wheezing or rhonchi. Abdominal:      General: Bowel sounds are normal. There is no distension. Palpations: Abdomen is soft. Tenderness: There is no abdominal tenderness. Musculoskeletal:         General: Normal range of motion. Cervical back: Normal range of motion and neck supple. Spasms and tenderness present. No rigidity or crepitus. Muscular tenderness present. No spinous process tenderness. Back:       Right lower leg: No edema. Left lower leg: No edema.       Comments: Tenderness and spasms noted to left trapezius  area    Skin:     General: Skin is warm and dry.      Capillary Refill: Capillary refill takes less than 2 seconds. Findings: Bruising (left upper outer thigh) present. Neurological:      General: No focal deficit present. Mental Status: She is alert and oriented to person, place, and time. Mental status is at baseline. Psychiatric:         Mood and Affect: Mood normal.         Behavior: Behavior normal.         Thought Content: Thought content normal.         Judgment: Judgment normal.         DIAGNOSTIC RESULTS     RADIOLOGY:   Non-plain film images such as CT, Ultrasound and MRI are read by the radiologist. Plain radiographic images are visualized and preliminarilyinterpreted by BRITTANY Monge CNP with the below findings:        Interpretation per the Radiologist below, if available at the time of this note:    No orders to display       LABS:  Labs Reviewed - No data to display    All other labs were within normal range or not returnedas of this dictation. EMERGENCYDEPARTMENT COURSE and DIFFERENTIAL DIAGNOSIS/MDM:   Vitals:    Vitals:    12/12/22 1403 12/12/22 1411   BP: (!) 141/81    Pulse:  88   Resp: 16    Temp: 96.8 °F (36 °C)    TempSrc: Tympanic    SpO2: 98%    Weight: 123 lb (55.8 kg)    Height: 5' 2\" (1.575 m)        REASSESSMENT            MDM  TK 35year old female presents to ED for neck pain and left leg pain. Patient afebrile and hemodynamically stable. Medicated with Flexeril po and Norco po. US left leg ordered patient refused to weight for tests despite educating on risks/benefits of testing. Pain improved post medication. Suspect patient has trapezious strain. Rx Flexeril and Norco given (patient denies any allergy or intolerance to Norco). Discussed Dx, Tx, Rx, follow up, reasons to return to ED for further treatment patient states understanding and denies any questions. PROCEDURES:    Procedures      FINAL IMPRESSION      1. Strain of neck muscle, initial encounter Stable   2.  Trapezius muscle spasm Stable DISPOSITION/PLAN   DISPOSITION Decision To Discharge 12/12/2022 03:59:34 PM      PATIENT REFERRED TO:  Md Jayla Lopez    In 2 days      [facility] ED  [address]  [phone]    If symptoms worsen    DISCHARGE MEDICATIONS:  New Prescriptions    CYCLOBENZAPRINE (FLEXERIL) 10 MG TABLET    Take 1 tablet by mouth 2 times daily as needed for Muscle spasms    HYDROCODONE-ACETAMINOPHEN (NORCO) 5-325 MG PER TABLET    Take 1 tablet by mouth every 8 hours as needed for Pain for up to 3 days. Intended supply: 3 days.  Take lowest dose possible to manage pain       (Please note that portions of this note were completed with a voice recognition program.  Efforts were made to edit the dictations but occasionally words are mis-transcribed.)    Merlinda Sic, APRN - CNP Merlinda Sic, APRN - CNP  12/12/22 [id number]

## 2022-12-12 NOTE — ED TRIAGE NOTES
Patient presents to ED with c/o left side neck/back pain that started  this week after packing a lot getting ready to move